# Patient Record
Sex: MALE | Race: WHITE | NOT HISPANIC OR LATINO | Employment: OTHER | URBAN - NONMETROPOLITAN AREA
[De-identification: names, ages, dates, MRNs, and addresses within clinical notes are randomized per-mention and may not be internally consistent; named-entity substitution may affect disease eponyms.]

---

## 2017-01-26 DIAGNOSIS — E78.5 HYPERLIPIDEMIA: Primary | ICD-10-CM

## 2017-01-30 RX ORDER — SIMVASTATIN 40 MG
TABLET ORAL
Qty: 45 TABLET | Refills: 1 | Status: SHIPPED | OUTPATIENT
Start: 2017-01-30 | End: 2018-01-18

## 2017-02-14 DIAGNOSIS — E11.9 DIABETES MELLITUS, TYPE 2 (H): ICD-10-CM

## 2017-02-14 NOTE — LETTER
[address] [address]  [phone]        Efrain Weiner  [address] [place] [id number]      February 14, 2017      Dear Efrain Weiner    APPOINTMENT REMINDER:       Our record indicates that it is time for you to be seen for a diabetes follow up with labs.    You may call our office at [phone] to schedule an appointment.    Please disregard this notice if you have already made an appointment.        Sincerely,    Rodolfo Cisneros MD  Family Practice

## 2017-02-14 NOTE — TELEPHONE ENCOUNTER
Metformin 500mg tab         Last Written Prescription Date: 5/17/2016  Last Fill Quantity: 180, # refills: 0  Last Office Visit with [facility], [facility] or [facility] prescribing provider:  6-1-2016        BP Readings from Last 3 Encounters:   06/01/16 128/74   01/11/16 173/95   10/20/15 130/82     Lab Results   Component Value Date    MICROL 9 06/01/2016     No results found for: MICROALBUMIN  Creatinine   Date Value Ref Range Status   06/01/2016 0.97 0.66 - 1.25 mg/dL Final   ]  GFR Estimate   Date Value Ref Range Status   06/01/2016 75 >60 mL/min/1.7m2 Final     Comment:     Non  GFR Calc   01/11/2016 60 (L) >60 mL/min/1.7m2 Final     Comment:     Non  GFR Calc   03/20/2015 71 >60 mL/min/1.7m2 Final     Comment:     Non  GFR Calc     GFR Estimate If Black   Date Value Ref Range Status   06/01/2016 >90   GFR Calc   >60 mL/min/1.7m2 Final   01/11/2016 73 >60 mL/min/1.7m2 Final     Comment:      GFR Calc   03/20/2015 86 >60 mL/min/1.7m2 Final     Comment:      GFR Calc     Lab Results   Component Value Date    CHOL 141 06/01/2016     Lab Results   Component Value Date    HDL 51 06/01/2016     Lab Results   Component Value Date    LDL 73 06/01/2016     Lab Results   Component Value Date    TRIG 84 06/01/2016     Lab Results   Component Value Date    CHOLHDLRATIO 3.2 03/20/2015     Lab Results   Component Value Date    AST 14 06/01/2016     Lab Results   Component Value Date    ALT 17 06/01/2016     Lab Results   Component Value Date    A1C 6.9 06/01/2016    A1C 6.6 03/20/2015    A1C 6.6 07/01/2014    A1C 7.0 03/20/2014    A1C 7.1 06/01/2012     Potassium   Date Value Ref Range Status   06/01/2016 4.2 3.4 - 5.3 mmol/L Final

## 2017-02-15 ENCOUNTER — HOSPITAL ENCOUNTER (EMERGENCY)
Facility: HOSPITAL | Age: 77
Discharge: HOME | End: 2017-02-15
Attending: NURSE PRACTITIONER | Admitting: NURSE PRACTITIONER
Payer: MEDICARE

## 2017-02-15 ENCOUNTER — TELEPHONE (OUTPATIENT)
Dept: FAMILY MEDICINE | Facility: OTHER | Age: 77
End: 2017-02-15

## 2017-02-15 VITALS — DIASTOLIC BLOOD PRESSURE: 81 MMHG | SYSTOLIC BLOOD PRESSURE: 162 MMHG | TEMPERATURE: 96.2 F | RESPIRATION RATE: 16 BRPM

## 2017-02-15 DIAGNOSIS — J01.00 ACUTE MAXILLARY SINUSITIS, RECURRENCE NOT SPECIFIED: ICD-10-CM

## 2017-02-15 PROCEDURE — 99213 OFFICE O/P EST LOW 20 MIN: CPT

## 2017-02-15 PROCEDURE — 99213 OFFICE O/P EST LOW 20 MIN: CPT | Performed by: NURSE PRACTITIONER

## 2017-02-15 ASSESSMENT — ENCOUNTER SYMPTOMS
SINUS PRESSURE: 1
SHORTNESS OF BREATH: 0
COUGH: 0
NAUSEA: 0
SORE THROAT: 0
STRIDOR: 0
CHILLS: 0
DYSURIA: 0
APPETITE CHANGE: 0
TROUBLE SWALLOWING: 0
VOMITING: 0
ACTIVITY CHANGE: 0
PSYCHIATRIC NEGATIVE: 1
FEVER: 0

## 2017-02-15 NOTE — ED AVS SNAPSHOT
[facility]    [address]    [place] [address]    Phone:  [phone]                                       Efrain Weiner   MRN: [id number]    Department:  [facility]   Date of Visit:  2/15/2017           Patient Information     Date Of Birth          1940        Your diagnoses for this visit were:     Acute maxillary sinusitis, recurrence not specified        You were seen by Ce Mcdaniel NP.      Follow-up Information     Follow up with Rodolfo Cisneros MD.    Specialty:  Family Practice    Why:  As needed, If symptoms worsen    Contact information:    [facility]  [address]  [place] [address] [phone]          Follow up with [facility].    Specialty:  EMERGENCY MEDICINE    Why:  As needed, If symptoms worsen    Contact information:    [address]  [place] [address] [phone]    Additional information:    From [place]: Take [address]. Turn left at [address]. Turn left at the first stoplight on [address]. At the first stop sign, take a right onto [address]. Take a left into the parking lot and continue through until you reach the North enterance of the building.       From [place]: Take [address]. Take the [address] ramp towards [place]. Turn left onto [address]. Take a slight right onto [address]. Turn left at the first stoplight on [address]. At the first stop sign, take a right onto [address]. Take a left into the parking lot and continue through until you reach the North enterance of the building.       From [place]: Take [address]. Take a right at [address]. At the first stop sign, take a right onto [address]. Take a left into the parking lot and continue through until you reach the North enterance of the building.         Discharge Instructions       Take antibiotics as directed.   Eat a yogurt a day  while on antibiotics.   Take Claritin daily for 10 days.   You can try Flonase 1 spray to each nostril daily for 10 days to decrease inflammation in sinuses.   Increase fluid intake.   Follow up with PCP with any increase in symptoms or concerns.   Return to urgent care or emergency department with any increase in symptoms or concerns.     Discharge References/Attachments     SINUSITIS (ANTIBIOTIC TREATMENT) (ENGLISH)         Review of your medicines      START taking        Dose / Directions Last dose taken    amoxicillin-clavulanate 875-125 MG per tablet   Commonly known as:  AUGMENTIN   Dose:  1 tablet   Quantity:  20 tablet        Take 1 tablet by mouth 2 times daily for 10 days   Refills:  0          Our records show that you are taking the medicines listed below. If these are incorrect, please call your family doctor or clinic.        Dose / Directions Last dose taken    blood glucose monitoring lancets   Quantity:  102 each        Use to test blood sugar 1 time daily or as directed.   Refills:  8        blood glucose monitoring meter device kit   Quantity:  1 kit        Use to test blood sugars 1 time daily or as directed.   Refills:  0        blood glucose monitoring test strip   Commonly known as:  ACCU-CHEK LIZETTE   Quantity:  1 Box        Use to test blood sugars 1 time daily or as directed.   Refills:  3        Buffered Aspirin 325 MG Tabs        daily   Refills:  0        DiphenhydrAMINE HCl (Sleep) 25 MG Caps   Dose:  1 tablet        Take 1 tablet by mouth bedtime   Refills:  0        GLUCOSAMINE CHONDRO COMPLEX OR        daily   Refills:  0        ibuprofen 200 MG tablet   Commonly known as:  ADVIL/MOTRIN   Dose:  1 tablet        Take 1 tablet by mouth every 6 hours as needed. With food   Refills:  0        metFORMIN 500 MG tablet   Commonly known as:  GLUCOPHAGE   Quantity:  60 tablet        Take one (1) tablet BY MOUTH twice daily WITH MEALS   Refills:  0        nabumetone 750 MG tablet   Commonly  "known as:  RELAFEN   Dose:  750 mg   Quantity:  180 tablet        Take 1 tablet (750 mg) by mouth 2 times daily (with meals)   Refills:  3        pseudoePHEDrine 60 MG tablet   Commonly known as:  SUDAFED        daily   Refills:  0        simvastatin 40 MG tablet   Commonly known as:  ZOCOR   Quantity:  45 tablet        Take one (1) tablet BY MOUTH every other day   Refills:  1        vitamin D 2000 UNITS tablet   Dose:  2000 Units   Quantity:  100 tablet        Take 2,000 Units by mouth daily   Refills:  3                Prescriptions were sent or printed at these locations (1 Prescription)                   Axel Patrick [address] [address]    Telephone:  [phone]   Fax:  [phone]   Hours:                  E-Prescribed (1 of 1)         amoxicillin-clavulanate (AUGMENTIN) 875-125 MG per tablet                Orders Needing Specimen Collection     None      Pending Results     No orders found from 2/13/2017 to 2/16/2017.            Pending Culture Results     No orders found from 2/13/2017 to 2/16/2017.            Thank you for choosing [facility]       Thank you for choosing [facility] for your care. Our goal is always to provide you with excellent care. Hearing back from our patients is one way we can continue to improve our services. Please take a few minutes to complete the written survey that you may receive in the mail after you visit with us. Thank you!        Perkle Information     [employer]kle lets you send messages to your doctor, view your test results, renew your prescriptions, schedule appointments and more. To sign up, go to [email or web address] . Click on \"Log in\" on the left side of the screen, which will take you to the Welcome page. Then click on \"Sign up Now\" on the right side of the page.     You will be asked to enter the access code listed below, as well as some personal information. Please follow the directions to create your username " and password.     Your access code is: [email or web address]  Expires: 2017  3:43 PM     Your access code will  in 90 days. If you need help or a new code, please call your [facility] or [phone].        Care EveryWhere ID     This is your Care EveryWhere ID. This could be used by other organizations to access your [facility] medical records  [id number]        After Visit Summary       This is your record. Keep this with you and show to your community pharmacist(s) and doctor(s) at your next visit.

## 2017-02-15 NOTE — ED AVS SNAPSHOT
[facility]    [address] [place] [address]    Phone:  [phone]                                       Efrain Weiner   MRN: [id number]    Department:  [facility]   Date of Visit:  2/15/2017           After Visit Summary Signature Page     I have received my discharge instructions, and my questions have been answered. I have discussed any challenges I see with this plan with the nurse or doctor.    ..........................................................................................................................................  Patient/Patient Representative Signature      ..........................................................................................................................................  Patient Representative Print Name and Relationship to Patient    ..................................................               ................................................  Date                                            Time    ..........................................................................................................................................  Reviewed by Signature/Title    ...................................................              ..............................................  Date                                                            Time

## 2017-02-15 NOTE — TELEPHONE ENCOUNTER
Pt calling stating that he has a sinus infection and would like Dr to call something in for him as he stated he knows it'll be a week before he can get in to see him. Please call him

## 2017-02-15 NOTE — ED PROVIDER NOTES
History     Chief Complaint   Patient presents with     Sinusitis     concerned about possible sinus infection and wants and antibiotic, notes starts as a tooth ache     The history is provided by the patient. No  was used.     Efrain Weiner is a 77 year old male who presents with sinus pressure for the past few days. Last sinus infection was a couple years ago. He has taken sudafed and benadryl that has been mildly effective. Denies fever, chills, or night sweats. Eating and drinking well. Bowel and bladder are working well. Blood glucose is ok when he checks it.       I have reviewed the Medications, Allergies, Past Medical and Surgical History, and Social History in the Epic system.    Review of Systems   Constitutional: Negative for activity change, appetite change, chills and fever.   HENT: Positive for congestion, ear pain, postnasal drip and sinus pressure. Negative for ear discharge, nosebleeds, sore throat and trouble swallowing.    Respiratory: Negative for cough, shortness of breath and stridor.    Gastrointestinal: Negative for nausea and vomiting.   Genitourinary: Negative for dysuria.   Skin: Negative for rash.   Psychiatric/Behavioral: Negative.        Physical Exam   BP: (!) 183/81. Recheck /81.  Heart Rate: 69  Temp: 96.2  F (35.7  C)  Resp: 16  Physical Exam   Constitutional: He is oriented to person, place, and time. He appears well-developed and well-nourished. No distress.   HENT:   Right Ear: External ear normal.   Left Ear: External ear normal.   Mouth/Throat: Oropharynx is clear and moist. No oropharyngeal exudate.   Maxillary sinus tenderness and fullness on palpation.    Neck: Normal range of motion. Neck supple.   Cardiovascular: Normal rate, regular rhythm and normal heart sounds.    Pulmonary/Chest: Effort normal. No respiratory distress. He has no wheezes. He has no rales.   Abdominal: Soft. He exhibits no distension.   Musculoskeletal: Normal range of  motion.   Lymphadenopathy:     He has no cervical adenopathy.   Neurological: He is alert and oriented to person, place, and time.   Skin: Skin is warm and dry. No rash noted. He is not diaphoretic.   Psychiatric: He has a normal mood and affect. His behavior is normal.   Nursing note and vitals reviewed.      ED Course     ED Course     Procedures      Assessments & Plan (with Medical Decision Making)     Discussed plan of care. He verbalized understanding. All questions answered.     I have reviewed the nursing notes.    I have reviewed the findings, diagnosis, plan and need for follow up with the patient.  Discharged in stable condition.     Discharge Medication List as of 2/15/2017  3:44 PM      START taking these medications    Details   amoxicillin-clavulanate (AUGMENTIN) 875-125 MG per tablet Take 1 tablet by mouth 2 times daily for 10 days, Disp-20 tablet, R-0, E-Prescribe             Final diagnoses:   Acute maxillary sinusitis, recurrence not specified      Take antibiotics as directed.   Eat a yogurt a day while on antibiotics.   Take Claritin daily for 10 days.   You can try Flonase 1 spray to each nostril daily for 10 days to decrease inflammation in sinuses.   Increase fluid intake.   Follow up with PCP with any increase in symptoms or concerns.   Return to urgent care or emergency department with any increase in symptoms or concerns.     TAMMY Israel  2/15/2017  3:22 PM  [facility] CL[facility]                 Ce Mcdaniel NP  02/20/17 1939

## 2017-02-15 NOTE — TELEPHONE ENCOUNTER
Please schedule patient for date/time: Patient needs to be seen, Urgent Care or with another provider. Antibiotics are not able to be prescribed over the phone.     Have patient go to ER/Urgent Care Center. Urgent Care hours are 9:30 am to 8 pm, open 7 days a week. Yes.    Provider will call patient.No.    Other:

## 2017-02-15 NOTE — ED NOTES
Pt presents today alone for c/o what he feels is a sinus infection and is hoping to get an Rx for Augmentin.

## 2017-02-21 NOTE — DISCHARGE INSTRUCTIONS
Take antibiotics as directed.   Eat a yogurt a day while on antibiotics.   Take Claritin daily for 10 days.   You can try Flonase 1 spray to each nostril daily for 10 days to decrease inflammation in sinuses.   Increase fluid intake.   Follow up with PCP with any increase in symptoms or concerns.   Return to urgent care or emergency department with any increase in symptoms or concerns.

## 2017-05-18 DIAGNOSIS — E11.9 CONTROLLED TYPE 2 DIABETES MELLITUS WITHOUT COMPLICATION, WITHOUT LONG-TERM CURRENT USE OF INSULIN (H): Primary | ICD-10-CM

## 2017-05-18 DIAGNOSIS — E11.9 DIABETES MELLITUS, TYPE 2 (H): ICD-10-CM

## 2017-05-18 NOTE — TELEPHONE ENCOUNTER
PCP Dr. Cisneros. Last office visit 06/01/16.  Metformin last filled 02/14/17 #60.  Patient due for office visit. No appointment made in [facility].

## 2017-05-23 ENCOUNTER — TELEPHONE (OUTPATIENT)
Dept: FAMILY MEDICINE | Facility: OTHER | Age: 77
End: 2017-05-23

## 2017-05-23 DIAGNOSIS — E78.2 MIXED HYPERLIPIDEMIA: ICD-10-CM

## 2017-05-23 DIAGNOSIS — C61 PROSTATE CANCER (H): Primary | ICD-10-CM

## 2017-05-23 DIAGNOSIS — Z12.5 ENCOUNTER FOR SCREENING FOR MALIGNANT NEOPLASM OF PROSTATE: ICD-10-CM

## 2017-05-23 DIAGNOSIS — E11.9 TYPE 2 DIABETES MELLITUS WITHOUT COMPLICATION (H): ICD-10-CM

## 2017-05-23 DIAGNOSIS — E11.21 CONTROLLED TYPE 2 DIABETES MELLITUS WITH DIABETIC NEPHROPATHY, WITH LONG-TERM CURRENT USE OF INSULIN (H): ICD-10-CM

## 2017-05-23 DIAGNOSIS — E11.9 TYPE 2 DIABETES MELLITUS WITHOUT COMPLICATION (H): Primary | ICD-10-CM

## 2017-05-23 DIAGNOSIS — C61 PROSTATE CANCER (H): ICD-10-CM

## 2017-05-23 DIAGNOSIS — Z79.4 CONTROLLED TYPE 2 DIABETES MELLITUS WITH DIABETIC NEPHROPATHY, WITH LONG-TERM CURRENT USE OF INSULIN (H): ICD-10-CM

## 2017-05-23 LAB
ALBUMIN SERPL-MCNC: 3.1 G/DL (ref 3.4–5)
ALBUMIN UR-MCNC: NEGATIVE MG/DL
ALP SERPL-CCNC: 92 U/L (ref 40–150)
ALT SERPL W P-5'-P-CCNC: 16 U/L (ref 0–70)
ANION GAP SERPL CALCULATED.3IONS-SCNC: 8 MMOL/L (ref 3–14)
APPEARANCE UR: CLEAR
AST SERPL W P-5'-P-CCNC: 12 U/L (ref 0–45)
BASOPHILS # BLD AUTO: 0.1 10E9/L (ref 0–0.2)
BASOPHILS NFR BLD AUTO: 1 %
BILIRUB SERPL-MCNC: 1.1 MG/DL (ref 0.2–1.3)
BILIRUB UR QL STRIP: NEGATIVE
BUN SERPL-MCNC: 14 MG/DL (ref 7–30)
CALCIUM SERPL-MCNC: 8.2 MG/DL (ref 8.5–10.1)
CHLORIDE SERPL-SCNC: 108 MMOL/L (ref 94–109)
CHOLEST SERPL-MCNC: 155 MG/DL
CO2 SERPL-SCNC: 25 MMOL/L (ref 20–32)
COLOR UR AUTO: NORMAL
CREAT SERPL-MCNC: 0.98 MG/DL (ref 0.66–1.25)
CREAT UR-MCNC: 50 MG/DL
DIFFERENTIAL METHOD BLD: NORMAL
EOSINOPHIL # BLD AUTO: 0.2 10E9/L (ref 0–0.7)
EOSINOPHIL NFR BLD AUTO: 3.4 %
ERYTHROCYTE [DISTWIDTH] IN BLOOD BY AUTOMATED COUNT: 13 % (ref 10–15)
EST. AVERAGE GLUCOSE BLD GHB EST-MCNC: 151 MG/DL
GFR SERPL CREATININE-BSD FRML MDRD: 75 ML/MIN/1.7M2
GLUCOSE SERPL-MCNC: 100 MG/DL (ref 70–99)
GLUCOSE UR STRIP-MCNC: NEGATIVE MG/DL
HBA1C MFR BLD: 6.9 % (ref 4.3–6)
HCT VFR BLD AUTO: 45.4 % (ref 40–53)
HDLC SERPL-MCNC: 47 MG/DL
HGB BLD-MCNC: 15.5 G/DL (ref 13.3–17.7)
HGB UR QL STRIP: NEGATIVE
IMM GRANULOCYTES # BLD: 0 10E9/L (ref 0–0.4)
IMM GRANULOCYTES NFR BLD: 0.2 %
KETONES UR STRIP-MCNC: NEGATIVE MG/DL
LDLC SERPL CALC-MCNC: 89 MG/DL
LEUKOCYTE ESTERASE UR QL STRIP: NEGATIVE
LYMPHOCYTES # BLD AUTO: 2 10E9/L (ref 0.8–5.3)
LYMPHOCYTES NFR BLD AUTO: 39.4 %
MCH RBC QN AUTO: 31.1 PG (ref 26.5–33)
MCHC RBC AUTO-ENTMCNC: 34.1 G/DL (ref 31.5–36.5)
MCV RBC AUTO: 91 FL (ref 78–100)
MICROALBUMIN UR-MCNC: 6 MG/L
MICROALBUMIN/CREAT UR: 11.79 MG/G CR (ref 0–17)
MONOCYTES # BLD AUTO: 0.4 10E9/L (ref 0–1.3)
MONOCYTES NFR BLD AUTO: 8.3 %
NEUTROPHILS # BLD AUTO: 2.4 10E9/L (ref 1.6–8.3)
NEUTROPHILS NFR BLD AUTO: 47.7 %
NITRATE UR QL: NEGATIVE
NONHDLC SERPL-MCNC: 108 MG/DL
NRBC # BLD AUTO: 0 10*3/UL
NRBC BLD AUTO-RTO: 0 /100
PH UR STRIP: 6.5 PH (ref 4.7–8)
PLATELET # BLD AUTO: 230 10E9/L (ref 150–450)
POTASSIUM SERPL-SCNC: 4.4 MMOL/L (ref 3.4–5.3)
PROT SERPL-MCNC: 7.4 G/DL (ref 6.8–8.8)
PSA SERPL-ACNC: NORMAL UG/L (ref 0–4)
RBC # BLD AUTO: 4.99 10E12/L (ref 4.4–5.9)
SODIUM SERPL-SCNC: 141 MMOL/L (ref 133–144)
SP GR UR STRIP: 1.01 (ref 1–1.03)
TRIGL SERPL-MCNC: 94 MG/DL
TSH SERPL DL<=0.05 MIU/L-ACNC: 1.46 MU/L (ref 0.4–4)
URN SPEC COLLECT METH UR: NORMAL
UROBILINOGEN UR STRIP-MCNC: NORMAL MG/DL (ref 0–2)
WBC # BLD AUTO: 5.1 10E9/L (ref 4–11)

## 2017-05-23 PROCEDURE — 82043 UR ALBUMIN QUANTITATIVE: CPT | Mod: ZL | Performed by: FAMILY MEDICINE

## 2017-05-23 PROCEDURE — 85025 COMPLETE CBC W/AUTO DIFF WBC: CPT | Mod: ZL | Performed by: FAMILY MEDICINE

## 2017-05-23 PROCEDURE — 80061 LIPID PANEL: CPT | Mod: ZL | Performed by: FAMILY MEDICINE

## 2017-05-23 PROCEDURE — 84443 ASSAY THYROID STIM HORMONE: CPT | Mod: ZL | Performed by: FAMILY MEDICINE

## 2017-05-23 PROCEDURE — 84153 ASSAY OF PSA TOTAL: CPT | Mod: ZL | Performed by: FAMILY MEDICINE

## 2017-05-23 PROCEDURE — 83036 HEMOGLOBIN GLYCOSYLATED A1C: CPT | Mod: ZL | Performed by: FAMILY MEDICINE

## 2017-05-23 PROCEDURE — 40000788 ZZHCL STATISTIC ESTIMATED AVERAGE GLUCOSE: Mod: ZL | Performed by: FAMILY MEDICINE

## 2017-05-23 PROCEDURE — 80053 COMPREHEN METABOLIC PANEL: CPT | Mod: ZL | Performed by: FAMILY MEDICINE

## 2017-05-23 PROCEDURE — 36415 COLL VENOUS BLD VENIPUNCTURE: CPT | Mod: ZL | Performed by: FAMILY MEDICINE

## 2017-05-23 PROCEDURE — 81003 URINALYSIS AUTO W/O SCOPE: CPT | Mod: ZL | Performed by: FAMILY MEDICINE

## 2017-05-23 PROCEDURE — G0103 PSA SCREENING: HCPCS | Mod: ZL | Performed by: FAMILY MEDICINE

## 2017-05-23 NOTE — TELEPHONE ENCOUNTER
Patient came in for lab only for Dr. Cisneros because he received a letter. Orders pending   SHAY FLORES

## 2017-05-23 NOTE — LETTER
[facility] [place]  [address] [facility]  [place] [address]  [phone]      May 24, 2017      Efrain Weiner  [address] [place] [id number]        Dear Efrain,    Below are the result of your most recent lab work:  Results for orders placed or performed in visit on 05/23/17   Hemoglobin A1c   Result Value Ref Range    Hemoglobin A1C 6.9 (H) 4.3 - 6.0 %   CBC with platelets differential   Result Value Ref Range    WBC 5.1 4.0 - 11.0 10e9/L    RBC Count 4.99 4.4 - 5.9 10e12/L    Hemoglobin 15.5 13.3 - 17.7 g/dL    Hematocrit 45.4 40.0 - 53.0 %    MCV 91 78 - 100 fl    MCH 31.1 26.5 - 33.0 pg    MCHC 34.1 31.5 - 36.5 g/dL    RDW 13.0 10.0 - 15.0 %    Platelet Count 230 150 - 450 10e9/L    Diff Method Automated Method     % Neutrophils 47.7 %    % Lymphocytes 39.4 %    % Monocytes 8.3 %    % Eosinophils 3.4 %    % Basophils 1.0 %    % Immature Granulocytes 0.2 %    Nucleated RBCs 0 0 /100    Absolute Neutrophil 2.4 1.6 - 8.3 10e9/L    Absolute Lymphocytes 2.0 0.8 - 5.3 10e9/L    Absolute Monocytes 0.4 0.0 - 1.3 10e9/L    Absolute Eosinophils 0.2 0.0 - 0.7 10e9/L    Absolute Basophils 0.1 0.0 - 0.2 10e9/L    Abs Immature Granulocytes 0.0 0 - 0.4 10e9/L    Absolute Nucleated RBC 0.0    Comprehensive metabolic panel   Result Value Ref Range    Sodium 141 133 - 144 mmol/L    Potassium 4.4 3.4 - 5.3 mmol/L    Chloride 108 94 - 109 mmol/L    Carbon Dioxide 25 20 - 32 mmol/L    Anion Gap 8 3 - 14 mmol/L    Glucose 100 (H) 70 - 99 mg/dL    Urea Nitrogen 14 7 - 30 mg/dL    Creatinine 0.98 0.66 - 1.25 mg/dL    GFR Estimate 75 >60 mL/min/1.7m2    GFR Estimate If Black >90   GFR Calc   >60 mL/min/1.7m2    Calcium 8.2 (L) 8.5 - 10.1 mg/dL    Bilirubin Total 1.1 0.2 - 1.3 mg/dL    Albumin 3.1 (L) 3.4 - 5.0 g/dL    Protein Total 7.4 6.8 - 8.8 g/dL    Alkaline Phosphatase 92 40 - 150 U/L    ALT 16 0 - 70 U/L    AST 12 0 - 45 U/L   UA reflex to Microscopic   Result Value Ref Range    Color Urine Light Yellow      Appearance Urine Clear     Glucose Urine Negative NEG mg/dL    Bilirubin Urine Negative NEG    Ketones Urine Negative NEG mg/dL    Specific Gravity Urine 1.008 1.003 - 1.035    Blood Urine Negative NEG    pH Urine 6.5 4.7 - 8.0 pH    Protein Albumin Urine Negative NEG mg/dL    Urobilinogen mg/dL Normal 0.0 - 2.0 mg/dL    Nitrite Urine Negative NEG    Leukocyte Esterase Urine Negative NEG    Source Midstream Urine    Albumin Random Urine Quantitative   Result Value Ref Range    Creatinine Urine 50 mg/dL    Albumin Urine mg/L 6 mg/L    Albumin Urine mg/g Cr 11.79 0 - 17 mg/g Cr   TSH   Result Value Ref Range    TSH 1.46 0.40 - 4.00 mU/L   Prostate spec antigen screen   Result Value Ref Range    PSA  0 - 4 ug/L     <0.01  Assay Method:  Chemiluminescence using Siemens Vista analyzer     Lipid Profile   Result Value Ref Range    Cholesterol 155 <200 mg/dL    Triglycerides 94 <150 mg/dL    HDL Cholesterol 47 >39 mg/dL    LDL Cholesterol Calculated 89 <100 mg/dL    Non HDL Cholesterol 108 <130 mg/dL   Estimated Average Glucose   Result Value Ref Range    Estimated Average Glucose 151 mg/dL     If you have any questions or concerns, please contact myself or my nurse at [phone].      Sincerely,        Rodolfo Cisneros MD

## 2017-05-25 ENCOUNTER — HOSPITAL ENCOUNTER (EMERGENCY)
Facility: HOSPITAL | Age: 77
Discharge: ANOTHER HOSPITAL | End: 2017-05-25
Attending: NURSE PRACTITIONER | Admitting: NURSE PRACTITIONER
Payer: MEDICARE

## 2017-05-25 ENCOUNTER — TRANSFERRED RECORDS (OUTPATIENT)
Dept: HEALTH INFORMATION MANAGEMENT | Facility: HOSPITAL | Age: 77
End: 2017-05-25

## 2017-05-25 VITALS
WEIGHT: 217 LBS | TEMPERATURE: 97.9 F | HEART RATE: 67 BPM | DIASTOLIC BLOOD PRESSURE: 93 MMHG | SYSTOLIC BLOOD PRESSURE: 162 MMHG | RESPIRATION RATE: 16 BRPM | HEIGHT: 71 IN | OXYGEN SATURATION: 98 % | BODY MASS INDEX: 30.38 KG/M2

## 2017-05-25 DIAGNOSIS — S09.90XA HEAD INJURY, INITIAL ENCOUNTER: ICD-10-CM

## 2017-05-25 DIAGNOSIS — S22.059A CLOSED FRACTURE OF FIFTH THORACIC VERTEBRA, UNSPECIFIED FRACTURE MORPHOLOGY, INITIAL ENCOUNTER (H): ICD-10-CM

## 2017-05-25 DIAGNOSIS — S01.01XA LACERATION OF SCALP, INITIAL ENCOUNTER: ICD-10-CM

## 2017-05-25 PROCEDURE — 72125 CT NECK SPINE W/O DYE: CPT | Mod: TC

## 2017-05-25 PROCEDURE — 99214 OFFICE O/P EST MOD 30 MIN: CPT | Performed by: NURSE PRACTITIONER

## 2017-05-25 PROCEDURE — 99214 OFFICE O/P EST MOD 30 MIN: CPT | Mod: 25

## 2017-05-25 PROCEDURE — 72128 CT CHEST SPINE W/O DYE: CPT | Mod: TC

## 2017-05-25 PROCEDURE — 96374 THER/PROPH/DIAG INJ IV PUSH: CPT

## 2017-05-25 PROCEDURE — 70450 CT HEAD/BRAIN W/O DYE: CPT | Mod: TC

## 2017-05-25 PROCEDURE — 25000128 H RX IP 250 OP 636: Performed by: NURSE PRACTITIONER

## 2017-05-25 RX ORDER — KETOROLAC TROMETHAMINE 15 MG/ML
15 INJECTION, SOLUTION INTRAMUSCULAR; INTRAVENOUS ONCE
Status: COMPLETED | OUTPATIENT
Start: 2017-05-25 | End: 2017-05-25

## 2017-05-25 RX ADMIN — KETOROLAC TROMETHAMINE 15 MG: 15 INJECTION, SOLUTION INTRAMUSCULAR; INTRAVENOUS at 17:09

## 2017-05-25 ASSESSMENT — ENCOUNTER SYMPTOMS
SPEECH DIFFICULTY: 0
WOUND: 1
WEAKNESS: 0
HEADACHES: 0
VOMITING: 0
LIGHT-HEADEDNESS: 0
BACK PAIN: 1
FACIAL SWELLING: 0
APPETITE CHANGE: 0
COUGH: 0
NECK PAIN: 0
CHILLS: 0
NUMBNESS: 0
SHORTNESS OF BREATH: 0
FEVER: 0
ABDOMINAL PAIN: 0
FACIAL ASYMMETRY: 0
CONFUSION: 0
SEIZURES: 0
DIZZINESS: 0
NAUSEA: 0

## 2017-05-25 NOTE — ED NOTES
Pt presents with c/o having an aluminum step ladder fold up while he was reaching up into his attic. Pt has an abrasion/laceration to the back of his head and abrasions on his left arm. Pt denies LOC, denies neck pain, does c/o back pain he rates 3/10. Ice pack applied to the back of head.

## 2017-05-25 NOTE — ED PROVIDER NOTES
History     Chief Complaint   Patient presents with     Head Laceration     The history is provided by the patient. No  was used.     Efrain Weiner is a 77 year old male who presents today with a CC of head laceration and back pain.  He was on an aluminum step ladder in his garage, approximately 4 feet in the air, the ladder gave way and he fell.  He hit his head on the  auger on the way down and sustained a head laceration.  He landed on his upper back.  No LOC.  He reports a minimal headache.  The bleeding is well controlled.  He is on  daily.  He denies change in vision, numbness or tingling.  No pre or post injury amnesia.  Tdap is up to date 4/2015.    I have reviewed the Medications, Allergies, Past Medical and Surgical History, and Social History in the Epic system.    Review of Systems   Constitutional: Negative for appetite change, chills and fever.   HENT: Negative for ear pain, facial swelling and nosebleeds.    Respiratory: Negative for cough and shortness of breath.    Cardiovascular: Negative for chest pain.   Gastrointestinal: Negative for abdominal pain, nausea and vomiting.   Musculoskeletal: Positive for back pain (upper back pain). Negative for neck pain.   Skin: Positive for wound (left parietal head laceration).   Neurological: Negative for dizziness, seizures, syncope, facial asymmetry, speech difficulty, weakness, light-headedness, numbness and headaches.   Psychiatric/Behavioral: Negative for behavioral problems and confusion.       Physical Exam   BP: 177/97  Pulse: 65  Temp: 97.3  F (36.3  C)  Resp: 16  SpO2: 98 %    Physical Exam   Constitutional: He is oriented to person, place, and time. He appears well-developed and well-nourished. He is cooperative.  Non-toxic appearance. He does not appear ill. No distress.   HENT:   Head: Normocephalic and atraumatic.   Right Ear: External ear normal.   Left Ear: External ear normal.   Nose: Nose normal.    Mouth/Throat: Uvula is midline and oropharynx is clear and moist.   Eyes: Conjunctivae and EOM are normal. Pupils are equal, round, and reactive to light.   Neck: Normal range of motion. Neck supple. No spinous process tenderness and no muscular tenderness present.   Cardiovascular: Normal rate and regular rhythm.    Pulmonary/Chest: Effort normal and breath sounds normal. No respiratory distress. He has no wheezes. He has no rales. He exhibits tenderness.   Musculoskeletal:        Right shoulder: He exhibits normal range of motion and no bony tenderness.        Left shoulder: He exhibits normal range of motion and no tenderness.        Right hip: He exhibits normal range of motion, normal strength and no bony tenderness.        Left hip: He exhibits normal range of motion, normal strength and no bony tenderness.        Thoracic back: He exhibits tenderness and bony tenderness (no step off notes). He exhibits no edema and no deformity.   Superficial abrasion noted to lower back and left forearm.  Upper back skin is intact without erythema, edema, or ecchymosis.     Neurological: He is alert and oriented to person, place, and time. He has normal strength. No cranial nerve deficit (cranial nerves 2-12 grossly intact on exam) or sensory deficit. GCS eye subscore is 4. GCS verbal subscore is 5. GCS motor subscore is 6.   Nursing note and vitals reviewed.      ED Course     ED Course     Procedures  Results for orders placed or performed during the hospital encounter of 05/25/17   Head CT w/o contrast    Narrative    CT SCAN OF BRAIN WITHOUT CONTRAST    REPORT:  There is a lacunar infarct seen in the anterior limb of the  internal capsule on the right. The ventricular system is normal in  size.  There is white-matter low density in both hemispheres  consistent with small-vessel disease.  Brainstem and cerebellum appear  normal.  Cranial vault is intact.  Paranasal sinuses are clear.    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  Exam Date: May 25, 2017 03:58:00 PM  Author: BETH PRASAD  This report is final and signed     Cervical spine CT w/o contrast    Narrative    CT SCAN OF CERVICAL SPINE    REPORT:  There is degenerative changes at the middle atlantoaxial and  right lateral atlantoaxial joints.  The C2-C3 disk is normal height.  There is mild anterior osteophytes at C3-C4, C4-C5, C5-C6 and C6-C7.  There are cervical facet joints throughout the cervical spine, worse  on the right than the left.  No fractures of the vertebral arches are  noted.  The prevertebral soft tissues appear normal.    IMPRESSION:  MODERATE DEGENERATIVE CHANGES.  NO FRACTURES.  Exam Date: May 25, 2017 03:56:00 PM  Author: BETH PRASAD  This report is final and signed         Preliminary verbal report of Thoracic spine CT - T4 & T5 Vertebral Body Fractures - unstable    Assessments & Plan (with Medical Decision Making)     I have reviewed the nursing notes.    I have reviewed the findings, diagnosis, plan and need for follow up with the patient.  Consulted with Dr Alba, Trauma Surgeon at [facility] who recommends Ambulance Transfer to [facility] for evaluation, patient should be backboarded for transfer, Toradol is ok for pain medication.  Dr Alba will be the accepting physician.  He will go to the ED.  Report was called to Pat in the ED.     Current Discharge Medication List          Final diagnoses:   Closed fracture of fifth thoracic vertebra, unspecified fracture morphology, initial encounter (H) - vertebral body fracture T4-T5, unstable   Head injury, initial encounter   Laceration of scalp, initial encounter       5/25/2017   [facility]     Ina Armendariz NP  05/25/17 [id number]       Ina Armendariz NP  05/25/17 [id number]

## 2017-05-25 NOTE — ED NOTES
Pt  Ambulated in UC with his wife. Hand on the right side of the head with minimal bleeding. Complaints of pain 3 out of 10. No complaints of dizziness. He states he was on the fourth step of the ladder when the ladder collapsed. He also has a scrap on his left arm. No medication prior to visit. He is able to stand however, he states his back does hurt.  Right hip and both knees replaced.

## 2017-05-26 ENCOUNTER — TRANSFERRED RECORDS (OUTPATIENT)
Dept: HEALTH INFORMATION MANAGEMENT | Facility: HOSPITAL | Age: 77
End: 2017-05-26

## 2017-05-26 NOTE — PROGRESS NOTES
CT Scan of Thoracic Spine report routed to PCP, Dr. RELILY Cisneros.  Findings - Fracture of the T4 vertebral body, this fracture does not involve the vertebral arch.  There is a fracture of T5.  The ER was called with this report.  Pt was seen in  on 5/25.  REILLY Armendariz NP consulted with Dr Alba, Trauma Surgeon at [facility] ED who recommends Ambulance Transfer to [facility] for evaluation, patient should be backboarded for transfer.

## 2017-06-07 ENCOUNTER — OFFICE VISIT (OUTPATIENT)
Dept: FAMILY MEDICINE | Facility: OTHER | Age: 77
End: 2017-06-07
Attending: FAMILY MEDICINE
Payer: COMMERCIAL

## 2017-06-07 VITALS
HEART RATE: 79 BPM | SYSTOLIC BLOOD PRESSURE: 146 MMHG | WEIGHT: 213 LBS | OXYGEN SATURATION: 94 % | DIASTOLIC BLOOD PRESSURE: 78 MMHG | TEMPERATURE: 98 F | RESPIRATION RATE: 20 BRPM | BODY MASS INDEX: 29.71 KG/M2

## 2017-06-07 DIAGNOSIS — S22.008D OTHER CLOSED FRACTURE OF THORACIC VERTEBRA WITH ROUTINE HEALING, UNSPECIFIED THORACIC VERTEBRAL LEVEL, SUBSEQUENT ENCOUNTER: ICD-10-CM

## 2017-06-07 DIAGNOSIS — S01.01XD SCALP LACERATION, SUBSEQUENT ENCOUNTER: Primary | ICD-10-CM

## 2017-06-07 PROCEDURE — 99212 OFFICE O/P EST SF 10 MIN: CPT

## 2017-06-07 PROCEDURE — 99213 OFFICE O/P EST LOW 20 MIN: CPT | Performed by: FAMILY MEDICINE

## 2017-06-07 ASSESSMENT — PAIN SCALES - GENERAL: PAINLEVEL: NO PAIN (0)

## 2017-06-07 NOTE — MR AVS SNAPSHOT
"              After Visit Summary   6/7/2017    Efrain Weiner    MRN: [id number]           Patient Information     Date Of Birth          1940        Visit Information        Provider Department      6/7/2017 9:20 AM Rodolfo Cisneros MD [facility]        Today's Diagnoses     Scalp laceration, subsequent encounter    -  1    Other closed fracture of thoracic vertebra with routine healing, unspecified thoracic vertebral level, subsequent encounter          Care Instructions    Follow up with Neurosurgery          Follow-ups after your visit        Follow-up notes from your care team     Return if symptoms worsen or fail to improve.      Who to contact     If you have questions or need follow up information about today's clinic visit or your schedule please contact [facility] directly at [phone].  Normal or non-critical lab and imaging results will be communicated to you by [employer], letter or phone within 4 business days after the clinic has received the results. If you do not hear from us within 7 days, please contact the clinic through [employer] or phone. If you have a critical or abnormal lab result, we will notify you by phone as soon as possible.  Submit refill requests through [employer] or call your pharmacy and they will forward the refill request to us. Please allow 3 business days for your refill to be completed.          Additional Information About Your Visit        [employer] Information     [employer] lets you send messages to your doctor, view your test results, renew your prescriptions, schedule appointments and more. To sign up, go to [email or web address] . Click on \"Log in\" on the left side of the screen, which will take you to the Welcome page. Then click on \"Sign up Now\" on the right side of the page.     You will be asked to enter the access code listed below, as well as some personal information. Please follow the directions to create your username and password.   "   Your access code is: [email or web address]  Expires: 2017  5:13 PM     Your access code will  in 90 days. If you need help or a new code, please call your [place] clinic or [phone].        Care EveryWhere ID     This is your Care EveryWhere ID. This could be used by other organizations to access your [place] medical records  [id number]        Your Vitals Were     Pulse Temperature Respirations Pulse Oximetry BMI (Body Mass Index)       79 98  F (36.7  C) 20 94% 29.71 kg/m2        Blood Pressure from Last 3 Encounters:   17 146/78   17 162/93   02/15/17 162/81    Weight from Last 3 Encounters:   17 213 lb (96.6 kg)   17 217 lb (98.4 kg)   16 210 lb (95.3 kg)              Today, you had the following     No orders found for display       Primary Care Provider Office Phone # Fax #    Rodolfo Cisneros [phone]       [facility] [address] [facility] [id number]        Thank you!     Thank you for choosing [facility]  for your care. Our goal is always to provide you with excellent care. Hearing back from our patients is one way we can continue to improve our services. Please take a few minutes to complete the written survey that you may receive in the mail after your visit with us. Thank you!             Your Updated Medication List - Protect others around you: Learn how to safely use, store and throw away your medicines at www.disposemymeds.org.          This list is accurate as of: 17 11:59 PM.  Always use your most recent med list.                   Brand Name Dispense Instructions for use    blood glucose monitoring lancets     102 each    Use to test blood sugar 1 time daily or as directed.       blood glucose monitoring meter device kit     1 kit    Use to test blood sugars 1 time daily or as directed.       blood glucose monitoring test strip    ACCU-CHEK LIZETTE    1 Box    Use to test blood sugars 1 time daily or as directed.        Buffered Aspirin 325 MG Tabs      daily       DiphenhydrAMINE HCl (Sleep) 25 MG Caps      Take 1 tablet by mouth bedtime       GLUCOSAMINE CHONDRO COMPLEX OR      daily       ibuprofen 200 MG tablet    ADVIL/MOTRIN     Take 1 tablet by mouth every 6 hours as needed. With food       metFORMIN 500 MG tablet    GLUCOPHAGE    60 tablet    Take one (1) tablet BY MOUTH twice daily WITH MEALS       pseudoePHEDrine 60 MG tablet    SUDAFED     daily       simvastatin 40 MG tablet    ZOCOR    45 tablet    Take one (1) tablet BY MOUTH every other day       vitamin D 2000 UNITS tablet     100 tablet    Take 2,000 Units by mouth daily

## 2017-06-07 NOTE — NURSING NOTE
"Chief Complaint   Patient presents with     Suture Removal     fell 2 weeks ago off of a ladder hitting head       Initial /78 (BP Location: Left arm, Patient Position: Chair, Cuff Size: Adult Regular)  Pulse 79  Temp 98  F (36.7  C)  Resp 20  Wt 213 lb (96.6 kg)  SpO2 94%  BMI 29.71 kg/m2 Estimated body mass index is 29.71 kg/(m^2) as calculated from the following:    Height as of 5/25/17: 5' 11\" (1.803 m).    Weight as of this encounter: 213 lb (96.6 kg).  Medication Reconciliation: complete     Elin Mcenal      "

## 2017-06-07 NOTE — PROGRESS NOTES
SUBJECTIVE:  Efrain Weiner, 77 year old, male is seen in follow up of scalp laceration with thoracic spine fracture.  He fell and was seen in the ER where CT was performed and showed:    There is a fracture of the T4 vertebral body.  This fracture  does not involve the vertebral arch.  There is a fracture at T5. On  one side,the fracture through the base of the pedicle on the right.  The fracture line extends obliquely through the body and exits through  the mid-vertebral body level on the left.  There is bridging  syndesmophytes at T6, T7, T8, T9 and T10. No other fractures are seen.  The paravertebral soft tissues are normal.    He was transferred to [facility] under the care of Neurosurgery and was discharged home. Surgery was not indicated and he has been in a brace. Records are reviewed.  Staples to be removed.    Denies fever, headache, visual disturbance, dizziness, focal weakness, numbness, tingling, paresthesias, tremor, or gait disturbance.    Current Outpatient Prescriptions   Medication Sig Dispense Refill     metFORMIN (GLUCOPHAGE) 500 MG tablet Take one (1) tablet BY MOUTH twice daily WITH MEALS 60 tablet 0     simvastatin (ZOCOR) 40 MG tablet Take one (1) tablet BY MOUTH every other day 45 tablet 1     blood glucose monitoring (ACCU-CHEK LIZETTE) test strip Use to test blood sugars 1 time daily or as directed. 1 Box 3     Cholecalciferol (VITAMIN D) 2000 UNITS tablet Take 2,000 Units by mouth daily 100 tablet 3     ACCU-CHEK MULTICLIX LANCETS MISC Use to test blood sugar 1 time daily or as directed. 102 each 8     Blood Glucose Monitoring Suppl (ACCU-CHEK LIZETTE PLUS) W/DEVICE KIT Use to test blood sugars 1 time daily or as directed. 1 kit 0     DiphenhydrAMINE HCl, Sleep, 25 MG CAPS Take 1 tablet by mouth bedtime       ibuprofen (ADVIL,MOTRIN) 200 MG tablet Take 1 tablet by mouth every 6 hours as needed. With food       PSEUDOEPHEDRINE HCL 60 MG OR TABS daily       GLUCOSAMINE CHONDRO COMPLEX OR  daily       BUFFERED ASPIRIN 325 MG OR TABS daily          Allergies   Allergen Reactions     Nkda [No Known Drug Allergies]        Past Medical History:   Diagnosis Date     Allergic rhinitis, seasonal 3/25/2011     BPH 4/8/2005     Cancer, Prostate 3/25/2011     Chronic prostatitis 9/5/2007     Diabetes Mellitus, Type 2 9/29/2004     Dyslipidemia 3/25/2011     Eczema 3/25/2011     Erectile Dysfunction 3/25/2011     Mixed hyperlipidemia      Nephrolithiasis 8/24/2011     TERESO on CPAP      Osteoarthritis, Generalized 3/25/2011     Past Surgical History:   Procedure Laterality Date     BIOPSY PROSTATE TRANSRECTAL       cataract extraction      right     COLONOSCOPY      2006     COLONOSCOPY  4-     excision of acrochordon       excision of peritonsillar abscess       kidney stones blasted  2011     laparoscopic bilateral inguinal hernia repair  8/2011    Bilateral inguinal hernia     prostatectomy  8/2011    Adenocarcinoma of the prostate     shoulder arthroscopy with Pam procedure      right     VASECTOMY       Family History   Problem Relation Age of Onset     Prostate Cancer Father      cause of death     DIABETES Father      Hypertension Father      Other - See Comments Father      renal disease     CEREBROVASCULAR DISEASE Mother      CVA     DIABETES Paternal Grandfather      Glaucoma Brother      Asthma No family hx of      Social History     Social History     Marital status:      Spouse name: N/A     Number of children: N/A     Years of education: N/A     Occupational History      Retired      Retired     Social History Main Topics     Smoking status: Former Smoker     Types: Cigarettes     Quit date: 1/29/1969     Smokeless tobacco: Never Used      Comment: no passive smoke exposure     Alcohol use Not on file      Comment: socially     Drug use: No     Sexual activity: Not on file     Other Topics Concern      Service No     Blood Transfusions Yes     Permits if needed     Caffeine  Concern Yes     2 cups coffee daily     Occupational Exposure No     Hobby Hazards No     Sleep Concern Yes     takes benadryl     Stress Concern No     Weight Concern No     Special Diet No     Back Care No     Exercise No     Seat Belt No     Self-Exams No     Parent/Sibling W/ Cabg, Mi Or Angioplasty Before 65f 55m? No     Social History Narrative         Review Of Systems  Constitutional, HEENT, cardiovascular, pulmonary, gi and gu systems are negative, except as otherwise noted.    OBJECTIVE:  Vitals: B/P: 146/78, T: 98, P: 79, R: 20    Exam:  Physical Exam   Constitutional: He is oriented to person, place, and time. He appears well-developed and well-nourished. No distress.   Neurological: He is alert and oriented to person, place, and time.   Skin: Skin is warm and dry.   The posterior scalp wound in intact.  8 staples are removed without difficulty.   Psychiatric: He has a normal mood and affect.     Other exam not repeated    Labs:  Telephone on 05/23/2017   Component Date Value Ref Range Status     Hemoglobin A1C 05/23/2017 6.9* 4.3 - 6.0 % Final     WBC 05/23/2017 5.1  4.0 - 11.0 10e9/L Final     RBC Count 05/23/2017 4.99  4.4 - 5.9 10e12/L Final     Hemoglobin 05/23/2017 15.5  13.3 - 17.7 g/dL Final     Hematocrit 05/23/2017 45.4  40.0 - 53.0 % Final     MCV 05/23/2017 91  78 - 100 fl Final     MCH 05/23/2017 31.1  26.5 - 33.0 pg Final     MCHC 05/23/2017 34.1  31.5 - 36.5 g/dL Final     RDW 05/23/2017 13.0  10.0 - 15.0 % Final     Platelet Count 05/23/2017 230  150 - 450 10e9/L Final     Diff Method 05/23/2017 Automated Method   Final     % Neutrophils 05/23/2017 47.7  % Final     % Lymphocytes 05/23/2017 39.4  % Final     % Monocytes 05/23/2017 8.3  % Final     % Eosinophils 05/23/2017 3.4  % Final     % Basophils 05/23/2017 1.0  % Final     % Immature Granulocytes 05/23/2017 0.2  % Final     Nucleated RBCs 05/23/2017 0  0 /100 Final     Absolute Neutrophil 05/23/2017 2.4  1.6 - 8.3 10e9/L Final      Absolute Lymphocytes 05/23/2017 2.0  0.8 - 5.3 10e9/L Final     Absolute Monocytes 05/23/2017 0.4  0.0 - 1.3 10e9/L Final     Absolute Eosinophils 05/23/2017 0.2  0.0 - 0.7 10e9/L Final     Absolute Basophils 05/23/2017 0.1  0.0 - 0.2 10e9/L Final     Abs Immature Granulocytes 05/23/2017 0.0  0 - 0.4 10e9/L Final     Absolute Nucleated RBC 05/23/2017 0.0   Final     Sodium 05/23/2017 141  133 - 144 mmol/L Final     Potassium 05/23/2017 4.4  3.4 - 5.3 mmol/L Final     Chloride 05/23/2017 108  94 - 109 mmol/L Final     Carbon Dioxide 05/23/2017 25  20 - 32 mmol/L Final     Anion Gap 05/23/2017 8  3 - 14 mmol/L Final     Glucose 05/23/2017 100* 70 - 99 mg/dL Final     Urea Nitrogen 05/23/2017 14  7 - 30 mg/dL Final     Creatinine 05/23/2017 0.98  0.66 - 1.25 mg/dL Final     GFR Estimate 05/23/2017 75  >60 mL/min/1.7m2 Final    Non  GFR Calc     GFR Estimate If Black 05/23/2017   >60 mL/min/1.7m2 Final                    Value:>90   GFR Calc       Calcium 05/23/2017 8.2* 8.5 - 10.1 mg/dL Final     Bilirubin Total 05/23/2017 1.1  0.2 - 1.3 mg/dL Final     Albumin 05/23/2017 3.1* 3.4 - 5.0 g/dL Final     Protein Total 05/23/2017 7.4  6.8 - 8.8 g/dL Final     Alkaline Phosphatase 05/23/2017 92  40 - 150 U/L Final     ALT 05/23/2017 16  0 - 70 U/L Final     AST 05/23/2017 12  0 - 45 U/L Final     Color Urine 05/23/2017 Light Yellow   Final     Appearance Urine 05/23/2017 Clear   Final     Glucose Urine 05/23/2017 Negative  NEG mg/dL Final     Bilirubin Urine 05/23/2017 Negative  NEG Final     Ketones Urine 05/23/2017 Negative  NEG mg/dL Final     Specific Gravity Urine 05/23/2017 1.008  1.003 - 1.035 Final     Blood Urine 05/23/2017 Negative  NEG Final     pH Urine 05/23/2017 6.5  4.7 - 8.0 pH Final     Protein Albumin Urine 05/23/2017 Negative  NEG mg/dL Final     Urobilinogen mg/dL 05/23/2017 Normal  0.0 - 2.0 mg/dL Final     Nitrite Urine 05/23/2017 Negative  NEG Final     Leukocyte  Esterase Urine 05/23/2017 Negative  NEG Final     Source 05/23/2017 Midstream Urine   Final     Creatinine Urine 05/23/2017 50  mg/dL Final     Albumin Urine mg/L 05/23/2017 6  mg/L Final     Albumin Urine mg/g Cr 05/23/2017 11.79  0 - 17 mg/g Cr Final     TSH 05/23/2017 1.46  0.40 - 4.00 mU/L Final     PSA 05/23/2017   0 - 4 ug/L Final                    Value:<0.01  Assay Method:  Chemiluminescence using Siemens Vista analyzer       Cholesterol 05/23/2017 155  <200 mg/dL Final     Triglycerides 05/23/2017 94  <150 mg/dL Final     HDL Cholesterol 05/23/2017 47  >39 mg/dL Final     LDL Cholesterol Calculated 05/23/2017 89  <100 mg/dL Final    Desirable:       <100 mg/dl     Non HDL Cholesterol 05/23/2017 108  <130 mg/dL Final     Estimated Average Glucose 05/23/2017 151  mg/dL Final       ASSESSMENT/PLAN:  Scalp laceration, subsequent encounter  Wound is intact.  Will follow.    Other closed fracture of thoracic vertebra with routine healing, unspecified thoracic vertebral level, subsequent encounter  Records reviewed.  Continue follow up with Neurosurgery            Rodolfo Cisneros MD

## 2017-06-15 DIAGNOSIS — E11.9 CONTROLLED TYPE 2 DIABETES MELLITUS WITHOUT COMPLICATION, WITHOUT LONG-TERM CURRENT USE OF INSULIN (H): ICD-10-CM

## 2018-01-02 DIAGNOSIS — M19.90 OSTEOARTHRITIS, UNSPECIFIED OSTEOARTHRITIS TYPE, UNSPECIFIED SITE: Primary | ICD-10-CM

## 2018-01-03 NOTE — TELEPHONE ENCOUNTER
Relafen       Last Written Prescription Date: 7/27/2017- medication dc from med list     Last Fill Quantity: 180,  # refills: 3   Last Office Visit with [facility], [facility] or [facility] prescribing provider: 6/07/2017

## 2018-01-18 DIAGNOSIS — E78.5 HYPERLIPIDEMIA: ICD-10-CM

## 2018-01-18 RX ORDER — SIMVASTATIN 40 MG
TABLET ORAL
Qty: 45 TABLET | Refills: 0 | Status: SHIPPED | OUTPATIENT
Start: 2018-01-18 | End: 2018-05-04

## 2018-01-18 NOTE — TELEPHONE ENCOUNTER
zocor      Last Written Prescription Date:  1/30/17  Last Fill Quantity: 45,   # refills: 1  Last Office Visit: 6/7/17  Future Office visit:  none

## 2018-02-13 ENCOUNTER — OFFICE VISIT (OUTPATIENT)
Dept: FAMILY MEDICINE | Facility: OTHER | Age: 78
End: 2018-02-13
Attending: FAMILY MEDICINE
Payer: COMMERCIAL

## 2018-02-13 VITALS
TEMPERATURE: 98.2 F | OXYGEN SATURATION: 95 % | BODY MASS INDEX: 29.82 KG/M2 | RESPIRATION RATE: 16 BRPM | HEART RATE: 73 BPM | WEIGHT: 213 LBS | HEIGHT: 71 IN | SYSTOLIC BLOOD PRESSURE: 132 MMHG | DIASTOLIC BLOOD PRESSURE: 64 MMHG

## 2018-02-13 DIAGNOSIS — G47.33 OSA (OBSTRUCTIVE SLEEP APNEA): Primary | ICD-10-CM

## 2018-02-13 PROCEDURE — 99213 OFFICE O/P EST LOW 20 MIN: CPT | Performed by: FAMILY MEDICINE

## 2018-02-13 PROCEDURE — G0463 HOSPITAL OUTPT CLINIC VISIT: HCPCS

## 2018-02-13 ASSESSMENT — ANXIETY QUESTIONNAIRES
7. FEELING AFRAID AS IF SOMETHING AWFUL MIGHT HAPPEN: NOT AT ALL
2. NOT BEING ABLE TO STOP OR CONTROL WORRYING: NOT AT ALL
GAD7 TOTAL SCORE: 0
5. BEING SO RESTLESS THAT IT IS HARD TO SIT STILL: NOT AT ALL
1. FEELING NERVOUS, ANXIOUS, OR ON EDGE: NOT AT ALL
6. BECOMING EASILY ANNOYED OR IRRITABLE: NOT AT ALL
4. TROUBLE RELAXING: NOT AT ALL
IF YOU CHECKED OFF ANY PROBLEMS ON THIS QUESTIONNAIRE, HOW DIFFICULT HAVE THESE PROBLEMS MADE IT FOR YOU TO DO YOUR WORK, TAKE CARE OF THINGS AT HOME, OR GET ALONG WITH OTHER PEOPLE: NOT DIFFICULT AT ALL
3. WORRYING TOO MUCH ABOUT DIFFERENT THINGS: NOT AT ALL

## 2018-02-13 ASSESSMENT — PAIN SCALES - GENERAL: PAINLEVEL: NO PAIN (0)

## 2018-02-13 NOTE — PROGRESS NOTES
SUBJECTIVE:  Efrain Weiner, 77 year old, male is seen with the following medical problems.    Obstructive sleep apnea. Art is seen for Medicare certification of CPAP coverage.  He has a history of obstructive sleep apnea and was diagnosed in the distant past and has had previous sleep study.  This resulted in the prescription for CPAP device.  He uses this more than 4 hours per night and is compliant with therapy.  His symptoms of excessive daytime sleepiness, snoring, and fatigue are significantly improved, indicating that he is benefiting from therapy.    Art has no history of hypertension, coronary heart disease, heart failure, and cerebrovascular disease.    Current Outpatient Prescriptions   Medication Sig Dispense Refill     simvastatin (ZOCOR) 40 MG tablet Take one (1) tablet BY MOUTH every other day 45 tablet 0     nabumetone (RELAFEN) 750 MG tablet Take one (1) tablet BY MOUTH twice daily with food 180 tablet 0     metFORMIN (GLUCOPHAGE) 500 MG tablet Take one (1) tablet BY MOUTH twice daily WITH MEALS 60 tablet 5     blood glucose monitoring (ACCU-CHEK LIZETTE) test strip Use to test blood sugars 1 time daily or as directed. 1 Box 3     Cholecalciferol (VITAMIN D) 2000 UNITS tablet Take 2,000 Units by mouth daily 100 tablet 3     ACCU-CHEK MULTICLIX LANCETS MISC Use to test blood sugar 1 time daily or as directed. 102 each 8     Blood Glucose Monitoring Suppl (ACCU-CHEK LIZETTE PLUS) W/DEVICE KIT Use to test blood sugars 1 time daily or as directed. 1 kit 0     DiphenhydrAMINE HCl, Sleep, 25 MG CAPS Take 1 tablet by mouth bedtime       ibuprofen (ADVIL,MOTRIN) 200 MG tablet Take 1 tablet by mouth every 6 hours as needed. With food       PSEUDOEPHEDRINE HCL 60 MG OR TABS daily       GLUCOSAMINE CHONDRO COMPLEX OR daily       BUFFERED ASPIRIN 325 MG OR TABS daily          Allergies   Allergen Reactions     Nkda [No Known Drug Allergies]        Past Medical History:   Diagnosis Date     Allergic rhinitis,  seasonal 3/25/2011     BPH 4/8/2005     Cancer, Prostate 3/25/2011     Chronic prostatitis 9/5/2007     Diabetes Mellitus, Type 2 9/29/2004     Dyslipidemia 3/25/2011     Eczema 3/25/2011     Erectile Dysfunction 3/25/2011     Mixed hyperlipidemia      Nephrolithiasis 8/24/2011     TERESO on CPAP      Osteoarthritis, Generalized 3/25/2011     Past Surgical History:   Procedure Laterality Date     BIOPSY PROSTATE TRANSRECTAL       cataract extraction      right     COLONOSCOPY      2006     COLONOSCOPY  4-     excision of acrochordon       excision of peritonsillar abscess       kidney stones blasted  2011     laparoscopic bilateral inguinal hernia repair  8/2011    Bilateral inguinal hernia     prostatectomy  8/2011    Adenocarcinoma of the prostate     shoulder arthroscopy with Pam procedure      right     VASECTOMY       Family History   Problem Relation Age of Onset     Prostate Cancer Father      cause of death     DIABETES Father      Hypertension Father      Other - See Comments Father      renal disease     CEREBROVASCULAR DISEASE Mother      CVA     DIABETES Paternal Grandfather      Glaucoma Brother      Asthma No family hx of      Social History     Social History     Marital status:      Spouse name: N/A     Number of children: N/A     Years of education: N/A     Occupational History      Retired      Retired     Social History Main Topics     Smoking status: Former Smoker     Types: Cigarettes     Quit date: 1/29/1969     Smokeless tobacco: Never Used      Comment: no passive smoke exposure     Alcohol use Not on file      Comment: socially     Drug use: No     Sexual activity: Not on file     Other Topics Concern      Service No     Blood Transfusions Yes     Permits if needed     Caffeine Concern Yes     2 cups coffee daily     Occupational Exposure No     Hobby Hazards No     Sleep Concern Yes     takes benadryl     Stress Concern No     Weight Concern No     Special Diet No      "Back Care No     Exercise No     Seat Belt No     Self-Exams No     Parent/Sibling W/ Cabg, Mi Or Angioplasty Before 65f 55m? No     Social History Narrative         Review Of Systems  Constitutional, HEENT, cardiovascular, pulmonary, gi and gu systems are negative, except as otherwise noted.    OBJECTIVE:  /64 (BP Location: Left arm, Patient Position: Sitting, Cuff Size: Adult Regular)  Pulse 73  Temp 98.2  F (36.8  C) (Tympanic)  Resp 16  Ht 5' 11\" (1.803 m)  Wt 213 lb (96.6 kg)  SpO2 95%  BMI 29.71 kg/m2      Exam:  Physical Exam   Constitutional: He is oriented to person, place, and time. He appears well-developed and well-nourished. No distress.   Neurological: He is alert and oriented to person, place, and time.   Psychiatric: He has a normal mood and affect.     Other exam not repeated    Labs:  Telephone on 05/23/2017   Component Date Value Ref Range Status     Hemoglobin A1C 05/23/2017 6.9* 4.3 - 6.0 % Final     WBC 05/23/2017 5.1  4.0 - 11.0 10e9/L Final     RBC Count 05/23/2017 4.99  4.4 - 5.9 10e12/L Final     Hemoglobin 05/23/2017 15.5  13.3 - 17.7 g/dL Final     Hematocrit 05/23/2017 45.4  40.0 - 53.0 % Final     MCV 05/23/2017 91  78 - 100 fl Final     MCH 05/23/2017 31.1  26.5 - 33.0 pg Final     MCHC 05/23/2017 34.1  31.5 - 36.5 g/dL Final     RDW 05/23/2017 13.0  10.0 - 15.0 % Final     Platelet Count 05/23/2017 230  150 - 450 10e9/L Final     Diff Method 05/23/2017 Automated Method   Final     % Neutrophils 05/23/2017 47.7  % Final     % Lymphocytes 05/23/2017 39.4  % Final     % Monocytes 05/23/2017 8.3  % Final     % Eosinophils 05/23/2017 3.4  % Final     % Basophils 05/23/2017 1.0  % Final     % Immature Granulocytes 05/23/2017 0.2  % Final     Nucleated RBCs 05/23/2017 0  0 /100 Final     Absolute Neutrophil 05/23/2017 2.4  1.6 - 8.3 10e9/L Final     Absolute Lymphocytes 05/23/2017 2.0  0.8 - 5.3 10e9/L Final     Absolute Monocytes 05/23/2017 0.4  0.0 - 1.3 10e9/L Final     " Absolute Eosinophils 05/23/2017 0.2  0.0 - 0.7 10e9/L Final     Absolute Basophils 05/23/2017 0.1  0.0 - 0.2 10e9/L Final     Abs Immature Granulocytes 05/23/2017 0.0  0 - 0.4 10e9/L Final     Absolute Nucleated RBC 05/23/2017 0.0   Final     Sodium 05/23/2017 141  133 - 144 mmol/L Final     Potassium 05/23/2017 4.4  3.4 - 5.3 mmol/L Final     Chloride 05/23/2017 108  94 - 109 mmol/L Final     Carbon Dioxide 05/23/2017 25  20 - 32 mmol/L Final     Anion Gap 05/23/2017 8  3 - 14 mmol/L Final     Glucose 05/23/2017 100* 70 - 99 mg/dL Final     Urea Nitrogen 05/23/2017 14  7 - 30 mg/dL Final     Creatinine 05/23/2017 0.98  0.66 - 1.25 mg/dL Final     GFR Estimate 05/23/2017 75  >60 mL/min/1.7m2 Final    Non  GFR Calc     GFR Estimate If Black 05/23/2017   >60 mL/min/1.7m2 Final                    Value:>90   GFR Calc       Calcium 05/23/2017 8.2* 8.5 - 10.1 mg/dL Final     Bilirubin Total 05/23/2017 1.1  0.2 - 1.3 mg/dL Final     Albumin 05/23/2017 3.1* 3.4 - 5.0 g/dL Final     Protein Total 05/23/2017 7.4  6.8 - 8.8 g/dL Final     Alkaline Phosphatase 05/23/2017 92  40 - 150 U/L Final     ALT 05/23/2017 16  0 - 70 U/L Final     AST 05/23/2017 12  0 - 45 U/L Final     Color Urine 05/23/2017 Light Yellow   Final     Appearance Urine 05/23/2017 Clear   Final     Glucose Urine 05/23/2017 Negative  NEG mg/dL Final     Bilirubin Urine 05/23/2017 Negative  NEG Final     Ketones Urine 05/23/2017 Negative  NEG mg/dL Final     Specific Gravity Urine 05/23/2017 1.008  1.003 - 1.035 Final     Blood Urine 05/23/2017 Negative  NEG Final     pH Urine 05/23/2017 6.5  4.7 - 8.0 pH Final     Protein Albumin Urine 05/23/2017 Negative  NEG mg/dL Final     Urobilinogen mg/dL 05/23/2017 Normal  0.0 - 2.0 mg/dL Final     Nitrite Urine 05/23/2017 Negative  NEG Final     Leukocyte Esterase Urine 05/23/2017 Negative  NEG Final     Source 05/23/2017 Midstream Urine   Final     Creatinine Urine 05/23/2017 50   mg/dL Final     Albumin Urine mg/L 05/23/2017 6  mg/L Final     Albumin Urine mg/g Cr 05/23/2017 11.79  0 - 17 mg/g Cr Final     TSH 05/23/2017 1.46  0.40 - 4.00 mU/L Final     PSA 05/23/2017   0 - 4 ug/L Final                    Value:<0.01  Assay Method:  Chemiluminescence using Siemens Vista analyzer       Cholesterol 05/23/2017 155  <200 mg/dL Final     Triglycerides 05/23/2017 94  <150 mg/dL Final     HDL Cholesterol 05/23/2017 47  >39 mg/dL Final     LDL Cholesterol Calculated 05/23/2017 89  <100 mg/dL Final    Desirable:       <100 mg/dl     Non HDL Cholesterol 05/23/2017 108  <130 mg/dL Final     Estimated Average Glucose 05/23/2017 151  mg/dL Final       ASSESSMENT/PLAN:  TERESO (obstructive sleep apnea)  CPAP supplies written.  He will continue current settings.  Routine follow up.  - order for DME; Equipment being ordered: CPAP supplies            Rodolfo Cisneros MD

## 2018-02-13 NOTE — MR AVS SNAPSHOT
"              After Visit Summary   2018    Efrain Weiner    MRN: [id number]           Patient Information     Date Of Birth          1940        Visit Information        Provider Department      2018 3:00 PM Rodolfo Cisneros MD [facility]        Today's Diagnoses     TERESO (obstructive sleep apnea)    -  1      Care Instructions    Suppies written          Follow-ups after your visit        Follow-up notes from your care team     Return if symptoms worsen or fail to improve.      Who to contact     If you have questions or need follow up information about today's clinic visit or your schedule please contact [facility] directly at [phone].  Normal or non-critical lab and imaging results will be communicated to you by MyChart, letter or phone within 4 business days after the clinic has received the results. If you do not hear from us within 7 days, please contact the clinic through MyChart or phone. If you have a critical or abnormal lab result, we will notify you by phone as soon as possible.  Submit refill requests through [employer] or call your pharmacy and they will forward the refill request to us. Please allow 3 business days for your refill to be completed.          Additional Information About Your Visit        MyChart Information     [employer] lets you send messages to your doctor, view your test results, renew your prescriptions, schedule appointments and more. To sign up, go to [email or web address] . Click on \"Log in\" on the left side of the screen, which will take you to the Welcome page. Then click on \"Sign up Now\" on the right side of the page.     You will be asked to enter the access code listed below, as well as some personal information. Please follow the directions to create your username and password.     Your access code is: [email or web address]  Expires: 2018  6:33 AM     Your access code will  in 90 days. If you need help or a new code, please " "call your [facility] clinic or [phone].        Care EveryWhere ID     This is your Care EveryWhere ID. This could be used by other organizations to access your [facility] medical records  [id number]        Your Vitals Were     Pulse Temperature Respirations Height Pulse Oximetry BMI (Body Mass Index)    73 98.2  F (36.8  C) (Tympanic) 16 5' 11\" (1.803 m) 95% 29.71 kg/m2       Blood Pressure from Last 3 Encounters:   02/13/18 132/64   06/07/17 146/78   05/25/17 162/93    Weight from Last 3 Encounters:   02/13/18 213 lb (96.6 kg)   06/07/17 213 lb (96.6 kg)   05/25/17 217 lb (98.4 kg)              Today, you had the following     No orders found for display         Today's Medication Changes          These changes are accurate as of 2/13/18 11:59 PM.  If you have any questions, ask your nurse or doctor.               Start taking these medicines.        Dose/Directions    order for DME   Used for:  TERESO (obstructive sleep apnea)   Started by:  Rodolfo Cisneros MD        Equipment being ordered: CPAP supplies   Quantity:  1 Device   Refills:  0            Where to get your medicines      Some of these will need a paper prescription and others can be bought over the counter.  Ask your nurse if you have questions.     Bring a paper prescription for each of these medications     order for DME                Primary Care Provider Office Phone # Fax #    Rodolfo Cisneros [phone]       [address] [place] [address]        Equal Access to Services     LEONOR SEN AH: Hadii ming pollock hadasho Socatali, waaxda luqadaha, qaybta kaalmada adeegyada, favian vides. So [facility] [phone].    ATENCIÓN: Si habla español, tiene a ni disposición servicios gratuitos de asistencia lingüística. Llame al [phone].    We comply with applicable federal civil rights laws and [place] laws. We do not discriminate on the basis of race, color, national origin, age, disability, sex, " sexual orientation, or gender identity.            Thank you!     Thank you for choosing [facility]  for your care. Our goal is always to provide you with excellent care. Hearing back from our patients is one way we can continue to improve our services. Please take a few minutes to complete the written survey that you may receive in the mail after your visit with us. Thank you!             Your Updated Medication List - Protect others around you: Learn how to safely use, store and throw away your medicines at www.disposemymeds.org.          This list is accurate as of 2/13/18 11:59 PM.  Always use your most recent med list.                   Brand Name Dispense Instructions for use Diagnosis    blood glucose monitoring lancets     102 each    Use to test blood sugar 1 time daily or as directed.    Diabetes mellitus, type 2 (H)       blood glucose monitoring meter device kit     1 kit    Use to test blood sugars 1 time daily or as directed.    Diabetes mellitus, type 2 (H)       blood glucose monitoring test strip    ACCU-CHEK LIZETTE    1 Box    Use to test blood sugars 1 time daily or as directed.    Diabetes mellitus, type 2 (H)       Buffered Aspirin 325 MG Tabs      daily        DiphenhydrAMINE HCl (Sleep) 25 MG Caps      Take 1 tablet by mouth bedtime        GLUCOSAMINE CHONDRO COMPLEX OR      daily        ibuprofen 200 MG tablet    ADVIL/MOTRIN     Take 1 tablet by mouth every 6 hours as needed. With food        metFORMIN 500 MG tablet    GLUCOPHAGE    60 tablet    Take one (1) tablet BY MOUTH twice daily WITH MEALS    Controlled type 2 diabetes mellitus without complication, without long-term current use of insulin (H)       nabumetone 750 MG tablet    RELAFEN    180 tablet    Take one (1) tablet BY MOUTH twice daily with food    Osteoarthritis, unspecified osteoarthritis type, unspecified site       order for DME     1 Device    Equipment being ordered: CPAP supplies    TERESO (obstructive sleep  apnea)       pseudoePHEDrine 60 MG tablet    SUDAFED     daily        simvastatin 40 MG tablet    ZOCOR    45 tablet    Take one (1) tablet BY MOUTH every other day    Hyperlipidemia       vitamin D 2000 UNITS tablet     100 tablet    Take 2,000 Units by mouth daily    Vitamin D deficiency

## 2018-02-13 NOTE — NURSING NOTE
"Chief Complaint   Patient presents with     CPAP Follow Up     Needs new cpap machine.  Needs a prescription for a new machine, tubing, filters and supplies.       Initial /64 (BP Location: Left arm, Patient Position: Sitting, Cuff Size: Adult Regular)  Pulse 73  Temp 98.2  F (36.8  C) (Tympanic)  Resp 16  Ht 5' 11\" (1.803 m)  Wt 213 lb (96.6 kg)  SpO2 95%  BMI 29.71 kg/m2 Estimated body mass index is 29.71 kg/(m^2) as calculated from the following:    Height as of this encounter: 5' 11\" (1.803 m).    Weight as of this encounter: 213 lb (96.6 kg).  Medication Reconciliation: complete     Ina Duvall      "

## 2018-02-14 ASSESSMENT — PATIENT HEALTH QUESTIONNAIRE - PHQ9: SUM OF ALL RESPONSES TO PHQ QUESTIONS 1-9: 1

## 2018-02-14 ASSESSMENT — ANXIETY QUESTIONNAIRES: GAD7 TOTAL SCORE: 0

## 2018-02-23 ENCOUNTER — TELEPHONE (OUTPATIENT)
Dept: FAMILY MEDICINE | Facility: OTHER | Age: 78
End: 2018-02-23

## 2018-02-23 DIAGNOSIS — G47.33 OSA (OBSTRUCTIVE SLEEP APNEA): ICD-10-CM

## 2018-02-23 NOTE — TELEPHONE ENCOUNTER
Patient called and states he needs a new mask for cpap. DME order was sent to [facility] instead of medical supplies. kPended new one for you.

## 2018-02-27 ENCOUNTER — MEDICAL CORRESPONDENCE (OUTPATIENT)
Dept: HEALTH INFORMATION MANAGEMENT | Facility: HOSPITAL | Age: 78
End: 2018-02-27

## 2018-07-03 DIAGNOSIS — E11.9 CONTROLLED TYPE 2 DIABETES MELLITUS WITHOUT COMPLICATION, WITHOUT LONG-TERM CURRENT USE OF INSULIN (H): ICD-10-CM

## 2018-07-03 NOTE — LETTER
July 5, 2018      Efrain Weiner  [address] [place] [id number]        Dear Efrain,     APPOINTMENT REMINDER:   Our records indicates that it is time for you to be seen for a diabetic follow-up and labs.      Your current medication request for metformin will be approved for one refill but you will need to be seen before any additional refills can be approved. Taking care of your health is important to us, and ongoing visits with your provider are vital to your care.    We look forward to seeing you in the near future.  You may call our office at [phone] to schedule a visit.     Please disregard this notice if you have already made an appointment.        Sincerely,  Dr. Amelia MD

## 2018-07-05 NOTE — TELEPHONE ENCOUNTER
Biguanide Agents Failed  metFORMIN (GLUCOPHAGE) 500 MG tablet [Pharmacy Med Name: METFORMIN  MG TABLET]       Rerun Protocol (7/3/2018 12:12 PM)        Patient has documented LDL within the past 12 mos.           Recent Labs   Lab Test  05/23/17   0953   LDL  89                  Patient has documented A1c within the specified period of time.       If HgbA1C is 8 or greater, it needs to be on file within the past 3 months.  If less than 8, must be on file within the past 6 months.          Recent Labs   Lab Test  05/23/17   0953   A1C  6.9*            Skye please call pt due for diabetic f/u thank you

## 2018-08-08 ENCOUNTER — TELEPHONE (OUTPATIENT)
Dept: FAMILY MEDICINE | Facility: OTHER | Age: 78
End: 2018-08-08

## 2018-08-08 DIAGNOSIS — E11.21 CONTROLLED TYPE 2 DIABETES MELLITUS WITH DIABETIC NEPHROPATHY, WITHOUT LONG-TERM CURRENT USE OF INSULIN (H): Primary | ICD-10-CM

## 2018-08-08 NOTE — TELEPHONE ENCOUNTER
3:58 PM    Reason for Call: Phone Call    Description: Pt called and states that he made an appointment on 08/27/18 for his diabetes check and would like to see if he could come in for some labs before his appointment on this day.    Was an appointment offered for this call? No  If yes : Appointment type              Date    Preferred method for responding to this message: Telephone Call  What is your phone number ?[phone]    If we cannot reach you directly, may we leave a detailed response at the number you provided? Yes    Can this message wait until your PCP/provider returns, if available today? YES, PCP is out     Enriqueta Mcmahan

## 2018-08-09 NOTE — TELEPHONE ENCOUNTER
Called and notified patient that labs are entered and he will be able to come before his appointment to get the labs first.  Patient also requested to change appointment from 8/27/18 to 8/28/18.  Rescheduled appointment for patient.     Next 5 appointments (look out 90 days)     Aug 28, 2018 10:20 AM CDT   (Arrive by 10:05 AM)   SHORT with Rodolfo Cisneros MD   [facility] ([facility] )    [facility] [place] [address]   [phone]

## 2018-08-27 DIAGNOSIS — E11.21 CONTROLLED TYPE 2 DIABETES MELLITUS WITH DIABETIC NEPHROPATHY, WITHOUT LONG-TERM CURRENT USE OF INSULIN (H): ICD-10-CM

## 2018-08-27 LAB
ALBUMIN SERPL-MCNC: 3 G/DL (ref 3.4–5)
ALP SERPL-CCNC: 86 U/L (ref 40–150)
ALT SERPL W P-5'-P-CCNC: 24 U/L (ref 0–70)
ANION GAP SERPL CALCULATED.3IONS-SCNC: 5 MMOL/L (ref 3–14)
AST SERPL W P-5'-P-CCNC: 13 U/L (ref 0–45)
BASOPHILS # BLD AUTO: 0 10E9/L (ref 0–0.2)
BASOPHILS NFR BLD AUTO: 0.5 %
BILIRUB SERPL-MCNC: 0.9 MG/DL (ref 0.2–1.3)
BUN SERPL-MCNC: 19 MG/DL (ref 7–30)
CALCIUM SERPL-MCNC: 8.1 MG/DL (ref 8.5–10.1)
CHLORIDE SERPL-SCNC: 110 MMOL/L (ref 94–109)
CO2 SERPL-SCNC: 27 MMOL/L (ref 20–32)
CREAT SERPL-MCNC: 1.04 MG/DL (ref 0.66–1.25)
DIFFERENTIAL METHOD BLD: NORMAL
EOSINOPHIL # BLD AUTO: 0.2 10E9/L (ref 0–0.7)
EOSINOPHIL NFR BLD AUTO: 3.1 %
ERYTHROCYTE [DISTWIDTH] IN BLOOD BY AUTOMATED COUNT: 13.2 % (ref 10–15)
GFR SERPL CREATININE-BSD FRML MDRD: 69 ML/MIN/1.7M2
GLUCOSE SERPL-MCNC: 125 MG/DL (ref 70–99)
HCT VFR BLD AUTO: 44.6 % (ref 40–53)
HGB BLD-MCNC: 15.4 G/DL (ref 13.3–17.7)
IMM GRANULOCYTES # BLD: 0 10E9/L (ref 0–0.4)
IMM GRANULOCYTES NFR BLD: 0.2 %
LYMPHOCYTES # BLD AUTO: 2.2 10E9/L (ref 0.8–5.3)
LYMPHOCYTES NFR BLD AUTO: 38 %
MCH RBC QN AUTO: 32 PG (ref 26.5–33)
MCHC RBC AUTO-ENTMCNC: 34.5 G/DL (ref 31.5–36.5)
MCV RBC AUTO: 93 FL (ref 78–100)
MONOCYTES # BLD AUTO: 0.5 10E9/L (ref 0–1.3)
MONOCYTES NFR BLD AUTO: 8.1 %
NEUTROPHILS # BLD AUTO: 2.9 10E9/L (ref 1.6–8.3)
NEUTROPHILS NFR BLD AUTO: 50.1 %
NRBC # BLD AUTO: 0 10*3/UL
NRBC BLD AUTO-RTO: 0 /100
PLATELET # BLD AUTO: 188 10E9/L (ref 150–450)
POTASSIUM SERPL-SCNC: 4.2 MMOL/L (ref 3.4–5.3)
PROT SERPL-MCNC: 7.4 G/DL (ref 6.8–8.8)
RBC # BLD AUTO: 4.81 10E12/L (ref 4.4–5.9)
SODIUM SERPL-SCNC: 142 MMOL/L (ref 133–144)
TSH SERPL DL<=0.005 MIU/L-ACNC: 1.7 MU/L (ref 0.4–4)
WBC # BLD AUTO: 5.8 10E9/L (ref 4–11)

## 2018-08-27 PROCEDURE — 85025 COMPLETE CBC W/AUTO DIFF WBC: CPT | Mod: ZL | Performed by: FAMILY MEDICINE

## 2018-08-27 PROCEDURE — 80053 COMPREHEN METABOLIC PANEL: CPT | Mod: ZL | Performed by: FAMILY MEDICINE

## 2018-08-27 PROCEDURE — 83036 HEMOGLOBIN GLYCOSYLATED A1C: CPT | Mod: ZL | Performed by: FAMILY MEDICINE

## 2018-08-27 PROCEDURE — 84443 ASSAY THYROID STIM HORMONE: CPT | Mod: ZL | Performed by: FAMILY MEDICINE

## 2018-08-27 PROCEDURE — 36415 COLL VENOUS BLD VENIPUNCTURE: CPT | Mod: ZL | Performed by: FAMILY MEDICINE

## 2018-08-27 PROCEDURE — 40000788 ZZHCL STATISTIC ESTIMATED AVERAGE GLUCOSE: Mod: ZL | Performed by: FAMILY MEDICINE

## 2018-08-28 ENCOUNTER — OFFICE VISIT (OUTPATIENT)
Dept: FAMILY MEDICINE | Facility: OTHER | Age: 78
End: 2018-08-28
Attending: FAMILY MEDICINE
Payer: COMMERCIAL

## 2018-08-28 VITALS
TEMPERATURE: 96.2 F | WEIGHT: 205 LBS | HEART RATE: 59 BPM | OXYGEN SATURATION: 97 % | RESPIRATION RATE: 18 BRPM | HEIGHT: 71 IN | BODY MASS INDEX: 28.7 KG/M2 | SYSTOLIC BLOOD PRESSURE: 136 MMHG | DIASTOLIC BLOOD PRESSURE: 60 MMHG

## 2018-08-28 DIAGNOSIS — E11.9 TYPE 2 DIABETES MELLITUS WITHOUT COMPLICATION, WITHOUT LONG-TERM CURRENT USE OF INSULIN (H): Primary | ICD-10-CM

## 2018-08-28 DIAGNOSIS — C61 PROSTATE CANCER (H): ICD-10-CM

## 2018-08-28 DIAGNOSIS — E78.2 MIXED HYPERLIPIDEMIA: ICD-10-CM

## 2018-08-28 LAB
EST. AVERAGE GLUCOSE BLD GHB EST-MCNC: 157 MG/DL
HBA1C MFR BLD: 7.1 % (ref 0–5.6)

## 2018-08-28 PROCEDURE — G0463 HOSPITAL OUTPT CLINIC VISIT: HCPCS

## 2018-08-28 PROCEDURE — 99214 OFFICE O/P EST MOD 30 MIN: CPT | Performed by: FAMILY MEDICINE

## 2018-08-28 ASSESSMENT — ANXIETY QUESTIONNAIRES
6. BECOMING EASILY ANNOYED OR IRRITABLE: NOT AT ALL
7. FEELING AFRAID AS IF SOMETHING AWFUL MIGHT HAPPEN: NOT AT ALL
2. NOT BEING ABLE TO STOP OR CONTROL WORRYING: NOT AT ALL
4. TROUBLE RELAXING: NOT AT ALL
5. BEING SO RESTLESS THAT IT IS HARD TO SIT STILL: NOT AT ALL
GAD7 TOTAL SCORE: 0
3. WORRYING TOO MUCH ABOUT DIFFERENT THINGS: NOT AT ALL
1. FEELING NERVOUS, ANXIOUS, OR ON EDGE: NOT AT ALL

## 2018-08-28 ASSESSMENT — PAIN SCALES - GENERAL: PAINLEVEL: NO PAIN (0)

## 2018-08-28 NOTE — LETTER
August 29, 2018      Efrain Weiner  [address] [place] [phone]        Dear ,    We are writing to inform you of your test results.    Your test results fall within the expected range(s) or remain unchanged from previous results.  Please continue with current treatment plan.    Resulted Orders   Hemoglobin A1c   Result Value Ref Range    Hemoglobin A1C 7.1 (H) 0 - 5.6 %      Comment:      Normal <5.7% Prediabetes 5.7-6.4%  Diabetes 6.5% or higher - adopted from ADA   consensus guidelines.     Estimated Average Glucose   Result Value Ref Range    Estimated Average Glucose 157 mg/dL       If you have any questions or concerns, please call the clinic at the number listed above.       Sincerely,        Rodolfo Cisneros MD

## 2018-08-28 NOTE — PROGRESS NOTES
SUBJECTIVE:   Efrain Weiner is a 78 year old male who presents to clinic today for the following health issues:    Diabetes Follow-up      Patient is checking blood sugars: rarely.  Results range from 120-150s    Diabetic concerns: None     Symptoms of hypoglycemia (low blood sugar): none     Paresthesias (numbness or burning in feet) or sores: No     Date of last diabetic eye exam: unknown    BP Readings from Last 2 Encounters:   08/28/18 136/60   02/13/18 132/64     Hemoglobin A1C (%)   Date Value   05/23/2017 6.9 (H)   06/01/2016 6.9 (H)     LDL Cholesterol Calculated (mg/dL)   Date Value   05/23/2017 89   06/01/2016 73       Diabetes Management Resources    Amount of exercise or physical activity: 2-3 days/week for an average of 30 minutes    Problems taking medications regularly: No    Medication side effects: none    Diet: regular (no restrictions)        Hyperlipidemia Follow-Up      Rate your low fat/cholesterol diet?: not monitoring fat    Taking statin?  Yes, no muscle aches from statin    Other lipid medications/supplements?:  none      Amount of exercise or physical activity: 2-3 days/week for an average of 30 minutes    Problems taking medications regularly: No    Medication side effects: none    Diet: regular (no restrictions)           Prostate cancer      Duration: Prior    Description (location/character/radiation): Adenocarcinoma    Intensity:  NA    Accompanying signs and symptoms: Nkne    History (similar episodes/previous evaluation): None    Precipitating or alleviating factors: None    Therapies tried and outcome: Prostatectomu       Problem list and histories reviewed & adjusted, as indicated.  Additional history: as documented    Patient Active Problem List   Diagnosis     Prostate cancer (H)     Dyslipidemia     Type 2 diabetes mellitus without complication (H)     BPH (benign prostatic hyerplasia)     Osteoarthrosis, multiple sites     ED (erectile dysfunction)     TERESO (obstructive  sleep apnea)     H/O nephrolithiasis     S/P total knee replacement, left     S/P total knee replacement, right     S/P hip replacement, right     Past Surgical History:   Procedure Laterality Date     BIOPSY PROSTATE TRANSRECTAL       cataract extraction      right     COLONOSCOPY      2006     COLONOSCOPY  4-     excision of acrochordon       excision of peritonsillar abscess       kidney stones blasted  2011     laparoscopic bilateral inguinal hernia repair  8/2011    Bilateral inguinal hernia     prostatectomy  8/2011    Adenocarcinoma of the prostate     shoulder arthroscopy with Pam procedure      right     VASECTOMY         Social History   Substance Use Topics     Smoking status: Former Smoker     Types: Cigarettes     Quit date: 1/29/1969     Smokeless tobacco: Never Used      Comment: no passive smoke exposure     Alcohol use Not on file      Comment: socially     Family History   Problem Relation Age of Onset     Prostate Cancer Father      cause of death     Diabetes Father      Hypertension Father      Other - See Comments Father      renal disease     Cerebrovascular Disease Mother      CVA     Diabetes Paternal Grandfather      Glaucoma Brother      Asthma No family hx of          Current Outpatient Prescriptions   Medication Sig Dispense Refill     ACCU-CHEK MULTICLIX LANCETS MISC Use to test blood sugar 1 time daily or as directed. 102 each 8     blood glucose monitoring (ACCU-CHEK LIZETTE) test strip Use to test blood sugars 1 time daily or as directed. 1 Box 3     Blood Glucose Monitoring Suppl (ACCU-CHEK LIZETTE PLUS) W/DEVICE KIT Use to test blood sugars 1 time daily or as directed. 1 kit 0     BUFFERED ASPIRIN 325 MG OR TABS daily       Cholecalciferol (VITAMIN D) 2000 UNITS tablet Take 2,000 Units by mouth daily 100 tablet 3     DiphenhydrAMINE HCl, Sleep, 25 MG CAPS Take 1 tablet by mouth bedtime       ibuprofen (ADVIL,MOTRIN) 200 MG tablet Take 1 tablet by mouth every 6 hours as  "needed. With food       metFORMIN (GLUCOPHAGE) 500 MG tablet TAKE ONE (1) TABLET BY MOUTH TWICE DAILY WITH MEALS 60 tablet 0     nabumetone (RELAFEN) 750 MG tablet TAKE ONE (1) TABLET BY MOUTH TWICE DAILY WITH FOOD 180 tablet 0     order for DME Equipment being ordered: CPAP supplies 1 Device 0     PSEUDOEPHEDRINE HCL 60 MG OR TABS daily       simvastatin (ZOCOR) 40 MG tablet TAKE ONE (1) TABLET BY MOUTH EVERY OTHER DAY 45 tablet 0     Allergies   Allergen Reactions     Nkda [No Known Drug Allergies]      Recent Labs   Lab Test  08/27/18   0837  05/23/17   0953  06/01/16   0818   03/20/15   0900   A1C   --   6.9*  6.9*   --   6.6*   LDL   --   89  73   --   107   HDL   --   47  51   --   55   TRIG   --   94  84   --   82   ALT  24  16  17   --   13   CR  1.04  0.98  0.97   < >  1.02   GFRESTIMATED  69  75  75   < >  71   GFRESTBLACK  84  >90   GFR Calc    >90   GFR Calc     < >  86   POTASSIUM  4.2  4.4  4.2   < >  4.3   TSH  1.70  1.46  1.35   --    --     < > = values in this interval not displayed.      BP Readings from Last 3 Encounters:   08/28/18 136/60   02/13/18 132/64   06/07/17 146/78    Wt Readings from Last 3 Encounters:   08/28/18 205 lb (93 kg)   02/13/18 213 lb (96.6 kg)   06/07/17 213 lb (96.6 kg)                    Reviewed and updated as needed this visit by clinical staff  Tobacco  Allergies  Meds  Problems  Med Hx  Surg Hx  Fam Hx  Soc Hx        Reviewed and updated as needed this visit by Provider         ROS:  Constitutional, HEENT, cardiovascular, pulmonary, gi and gu systems are negative, except as otherwise noted.    OBJECTIVE:     /60 (BP Location: Left arm, Patient Position: Sitting, Cuff Size: Adult Regular)  Pulse 59  Temp 96.2  F (35.7  C) (Tympanic)  Resp 18  Ht 5' 11\" (1.803 m)  Wt 205 lb (93 kg)  SpO2 97%  BMI 28.59 kg/m2  Body mass index is 28.59 kg/(m^2).  Physical Exam   Constitutional: He is oriented to person, place, and time. " He appears well-developed and well-nourished. No distress.   HENT:   Head: Normocephalic.   Right Ear: Tympanic membrane, external ear and ear canal normal.   Left Ear: Tympanic membrane, external ear and ear canal normal.   Nose: Nose normal.   Mouth/Throat: Oropharynx is clear and moist.   Eyes: Conjunctivae are normal.   Neck: Neck supple. No JVD present. No thyromegaly present.   Cardiovascular: Normal rate, regular rhythm, normal heart sounds and intact distal pulses.  Exam reveals no gallop and no friction rub.    No murmur heard.  Pulmonary/Chest: Effort normal and breath sounds normal. He has no rales.   Musculoskeletal: He exhibits no edema.   Neurological: He is alert and oriented to person, place, and time.   Skin: Skin is warm and dry.   Psychiatric: He has a normal mood and affect.       Other exam not repeated.  Diagnostic Test Results:  Results for orders placed or performed in visit on 08/28/18   Hemoglobin A1c   Result Value Ref Range    Hemoglobin A1C 7.1 (H) 0 - 5.6 %   Estimated Average Glucose   Result Value Ref Range    Estimated Average Glucose 157 mg/dL       ASSESSMENT/PLAN:     Type 2 diabetes mellitus without complication, without long-term current use of insulin (H)  Fasting labs are reviewed.  Goal of hemoglobin A1C of less than 7 discussed.  Instructed in the importance of diet, exercise, and good glycemic control in prevention of secondary complications.    Continue same medication regimen. Repeat fasting glucose and hemoglobin A1C in 6 months.    - Hemoglobin A1c  - Estimated Average Glucose    Dyslipidemia  Fasting labs reviewed.  Goals discussed.  Discussed the importance of diet, exercise, and weight reduction.  Follow up 12 months.     Prostate cancer (H)  Stable.  He is followed by Urology.  Routine follow up             Rodolfo Cisneros MD  [facility]

## 2018-08-29 ASSESSMENT — PATIENT HEALTH QUESTIONNAIRE - PHQ9: SUM OF ALL RESPONSES TO PHQ QUESTIONS 1-9: 2

## 2018-08-29 ASSESSMENT — ANXIETY QUESTIONNAIRES: GAD7 TOTAL SCORE: 0

## 2018-09-10 DIAGNOSIS — E78.5 HYPERLIPIDEMIA: ICD-10-CM

## 2018-09-10 DIAGNOSIS — E11.9 CONTROLLED TYPE 2 DIABETES MELLITUS WITHOUT COMPLICATION, WITHOUT LONG-TERM CURRENT USE OF INSULIN (H): ICD-10-CM

## 2018-09-10 DIAGNOSIS — M19.90 OSTEOARTHRITIS, UNSPECIFIED OSTEOARTHRITIS TYPE, UNSPECIFIED SITE: ICD-10-CM

## 2018-09-11 RX ORDER — SIMVASTATIN 40 MG
TABLET ORAL
Qty: 45 TABLET | Refills: 0 | Status: SHIPPED | OUTPATIENT
Start: 2018-09-11 | End: 2018-11-02

## 2018-09-11 NOTE — TELEPHONE ENCOUNTER
Metformin       Last Written Prescription Date:  7/05/2018  Last Fill Quantity: 60,   # refills: 0  Last Office Visit: 8/28/2018  Future Office visit:

## 2018-09-11 NOTE — TELEPHONE ENCOUNTER
Relafen       Last Written Prescription Date:  5/25/2018  Last Fill Quantity: 180,   # refills: 0  Last Office Visit: 8/28/2018  Future Office visit:         Zocor       Last Written Prescription Date:  5/07/2018  Last Fill Quantity: 45,   # refills: 0  Last Office Visit: 8/28/2018  Future Office visit:

## 2018-11-02 DIAGNOSIS — E78.5 DYSLIPIDEMIA: Primary | ICD-10-CM

## 2018-11-02 DIAGNOSIS — E11.9 CONTROLLED TYPE 2 DIABETES MELLITUS WITHOUT COMPLICATION, WITHOUT LONG-TERM CURRENT USE OF INSULIN (H): ICD-10-CM

## 2018-11-02 DIAGNOSIS — E78.5 HYPERLIPIDEMIA: ICD-10-CM

## 2018-11-02 RX ORDER — SIMVASTATIN 40 MG
TABLET ORAL
Qty: 45 TABLET | Refills: 0 | Status: SHIPPED | OUTPATIENT
Start: 2018-11-02 | End: 2019-05-22

## 2018-11-02 NOTE — TELEPHONE ENCOUNTER
Protocol failed due to   LDL on file in past 12 months  LDL Cholesterol Calculated   Date Value Ref Range Status   05/23/2017 89 <100 mg/dL Final     Comment:     Desirable:       <100 mg/dl     Please advise. Thank you!

## 2018-11-02 NOTE — TELEPHONE ENCOUNTER
metFORMIN (GLUCOPHAGE) 500 MG tablet      Last Written Prescription Date:  9/11/18  Last Fill Quantity: 60,   # refills: 0  Last Office Visit: 8/8/18  Future Office visit:

## 2018-11-02 NOTE — TELEPHONE ENCOUNTER
Protocol failed due to    Patient has documented LDL within the past 12 mos.    Patient has had a Microalbumin in the past 15 mos.     LDL Cholesterol Calculated   Date Value Ref Range Status   05/23/2017 89 <100 mg/dL Final     Comment:     Desirable:       <100 mg/dl     Please advise. Thank you!

## 2018-11-02 NOTE — TELEPHONE ENCOUNTER
simvastatin (ZOCOR) 40 MG tablet     Last Written Prescription Date:  9/11/18  Last Fill Quantity: 45,   # refills: 0  Last Office Visit: 8/28/18  Future Office visit:

## 2018-11-05 NOTE — TELEPHONE ENCOUNTER
"Pt called to confirm \"I take my metformin one tablet,  twice a day.\" \"Dr. Cisneros called and left VM wondering how I take it.\"   "

## 2019-01-03 ENCOUNTER — HOSPITAL ENCOUNTER (EMERGENCY)
Facility: HOSPITAL | Age: 79
Discharge: HOME | End: 2019-01-03
Attending: NURSE PRACTITIONER | Admitting: NURSE PRACTITIONER
Payer: MEDICARE

## 2019-01-03 VITALS
BODY MASS INDEX: 29.4 KG/M2 | SYSTOLIC BLOOD PRESSURE: 167 MMHG | WEIGHT: 210 LBS | TEMPERATURE: 96.2 F | OXYGEN SATURATION: 94 % | HEIGHT: 71 IN | RESPIRATION RATE: 20 BRPM | DIASTOLIC BLOOD PRESSURE: 84 MMHG | HEART RATE: 84 BPM

## 2019-01-03 DIAGNOSIS — J01.00 ACUTE MAXILLARY SINUSITIS, RECURRENCE NOT SPECIFIED: ICD-10-CM

## 2019-01-03 DIAGNOSIS — Z87.891 PERSONAL HISTORY OF TOBACCO USE, PRESENTING HAZARDS TO HEALTH: ICD-10-CM

## 2019-01-03 PROCEDURE — G0463 HOSPITAL OUTPT CLINIC VISIT: HCPCS

## 2019-01-03 PROCEDURE — 99213 OFFICE O/P EST LOW 20 MIN: CPT | Mod: Z6 | Performed by: NURSE PRACTITIONER

## 2019-01-03 RX ORDER — FLUTICASONE PROPIONATE 50 MCG
1 SPRAY, SUSPENSION (ML) NASAL DAILY
Qty: 1 BOTTLE | Refills: 3 | Status: SHIPPED | OUTPATIENT
Start: 2019-01-03 | End: 2019-04-05

## 2019-01-03 ASSESSMENT — ENCOUNTER SYMPTOMS
CHILLS: 1
HEMATOLOGIC/LYMPHATIC NEGATIVE: 1
ALLERGIC/IMMUNOLOGIC NEGATIVE: 1
ENDOCRINE NEGATIVE: 1
FATIGUE: 1
EYES NEGATIVE: 1
SINUS PRESSURE: 1
CARDIOVASCULAR NEGATIVE: 1
COUGH: 1
PSYCHIATRIC NEGATIVE: 1
ROS SKIN COMMENTS: RIGHT FOREARM
SHORTNESS OF BREATH: 1
SINUS PAIN: 1
GASTROINTESTINAL NEGATIVE: 1
HEADACHES: 1
MUSCULOSKELETAL NEGATIVE: 1
FEVER: 1

## 2019-01-03 ASSESSMENT — MIFFLIN-ST. JEOR: SCORE: 1699.68

## 2019-01-03 NOTE — ED PROVIDER NOTES
History     Chief Complaint   Patient presents with     Sinusitis     X 1 week     The history is provided by the patient.     Efrain Weiner is a 78 year old male who presents to the  not feeling well. Art states he thought he started with a cold, but feels it has changed to a sinus infection. He states his symptoms started about a week ago, but are progressively getting worse. Denies any recent antibiotic use.     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     Priority: Medium     S/P total knee replacement, right 06/07/2016     Priority: Medium     S/P hip replacement, right 06/07/2016     Priority: Medium     H/O nephrolithiasis 06/01/2016     Priority: Medium     Dyslipidemia      Priority: Medium     TERESO (obstructive sleep apnea)      Priority: Medium     Prostate cancer (H) 04/26/2013     Priority: Medium     08/2011  S/P DaVinci prostatectomy       Osteoarthrosis, multiple sites 03/25/2011     Priority: Medium     ED (erectile dysfunction) 03/25/2011     Priority: Medium     BPH (benign prostatic hyerplasia) 04/08/2005     Priority: Medium     Type 2 diabetes mellitus without complication (H) 09/29/2004     Priority: Medium        Past Medical History:    Past Medical History:   Diagnosis Date     Allergic rhinitis, seasonal 3/25/2011     BPH 4/8/2005     Cancer, Prostate 3/25/2011     Chronic prostatitis 9/5/2007     Diabetes Mellitus, Type 2 9/29/2004     Dyslipidemia 3/25/2011     Eczema 3/25/2011     Erectile Dysfunction 3/25/2011     Mixed hyperlipidemia      Nephrolithiasis 8/24/2011     TERESO on CPAP      Osteoarthritis, Generalized 3/25/2011       Past Surgical History:    Past Surgical History:   Procedure Laterality Date     BIOPSY PROSTATE TRANSRECTAL       cataract extraction      right     COLONOSCOPY      2006     COLONOSCOPY  4-     excision of acrochordon       excision of peritonsillar abscess       kidney  stones blasted       laparoscopic bilateral inguinal hernia repair  2011    Bilateral inguinal hernia     prostatectomy  2011    Adenocarcinoma of the prostate     shoulder arthroscopy with Pam procedure      right     VASECTOMY         Family History:    Family History   Problem Relation Age of Onset     Prostate Cancer Father         cause of death     Diabetes Father      Hypertension Father      Other - See Comments Father         renal disease     Cerebrovascular Disease Mother         CVA     Diabetes Paternal Grandfather      Glaucoma Brother      Asthma No family hx of        Social History:  Marital Status:   [2]  Social History     Tobacco Use     Smoking status: Former Smoker     Types: Cigarettes     Last attempt to quit: 1969     Years since quittin.9     Smokeless tobacco: Never Used     Tobacco comment: no passive smoke exposure   Substance Use Topics     Alcohol use: Not on file     Comment: socially     Drug use: No        Medications:      ACCU-CHEK MULTICLIX LANCETS MISC   amoxicillin-clavulanate (AUGMENTIN) 875-125 MG tablet   blood glucose monitoring (ACCU-CHEK LIZETTE) test strip   Blood Glucose Monitoring Suppl (ACCU-CHEK LIZETTE PLUS) W/DEVICE KIT   BUFFERED ASPIRIN 325 MG OR TABS   Cholecalciferol (VITAMIN D) 2000 UNITS tablet   DiphenhydrAMINE HCl, Sleep, 25 MG CAPS   fluticasone (FLONASE) 50 MCG/ACT nasal spray   ibuprofen (ADVIL,MOTRIN) 200 MG tablet   metFORMIN (GLUCOPHAGE) 500 MG tablet   nabumetone (RELAFEN) 750 MG tablet   order for DME   PSEUDOEPHEDRINE HCL 60 MG OR TABS   simvastatin (ZOCOR) 40 MG tablet         Review of Systems   Constitutional: Positive for chills, fatigue and fever.   HENT: Positive for ear pain, mouth sores, sinus pressure and sinus pain.    Eyes: Negative.    Respiratory: Positive for cough and shortness of breath.    Cardiovascular: Negative.    Gastrointestinal: Negative.    Endocrine: Negative.    Genitourinary: Negative.   "  Musculoskeletal: Negative.    Skin: Positive for rash.        Right forearm    Allergic/Immunologic: Negative.    Neurological: Positive for headaches.   Hematological: Negative.    Psychiatric/Behavioral: Negative.        Physical Exam   BP: 167/84  Pulse: 84  Temp: 96.2  F (35.7  C)  Resp: 20  Height: 180.3 cm (5' 11\")  Weight: 95.3 kg (210 lb)  SpO2: 94 %      Physical Exam   HENT:   Right Ear: External ear normal.   Nose: Right sinus exhibits maxillary sinus tenderness. Left sinus exhibits maxillary sinus tenderness.   Mouth/Throat: Uvula is midline, oropharynx is clear and moist and mucous membranes are normal.   Cardiovascular: Normal rate and regular rhythm.   Pulmonary/Chest: Effort normal and breath sounds normal. No respiratory distress.   Abdominal: Soft. Bowel sounds are normal. There is no tenderness.       ED Course        Procedures              Critical Care time:  none             No results found for this or any previous visit (from the past 24 hour(s)).    Medications - No data to display    Assessments & Plan (with Medical Decision Making)     I have reviewed the nursing notes.    I have reviewed the findings, diagnosis, plan and need for follow up with the patient.  Patient verbally educated and given appropriate education sheets for each of the diagnoses and has no questions.  Take OTC motrin or tylenol as directed on the bottle as needed.  Take prescription medications as directed.  Increase fluids, wash hands often.  Sleep in a recliner or with multiple pillows until this has resolved.  Follow up with your provider if symptoms increase or if further concerns develop, return to the ER.             Medication List      Started    amoxicillin-clavulanate 875-125 MG tablet  Commonly known as:  AUGMENTIN  1 tablet, Oral, 2 TIMES DAILY     fluticasone 50 MCG/ACT nasal spray  Commonly known as:  FLONASE  1 spray, Both Nostrils, DAILY            Final diagnoses:   Acute maxillary sinusitis, " recurrence not specified       1/3/2019   [facility]     BeverlyTracie skaggs, APRN CNP  01/03/19 1145

## 2019-01-03 NOTE — ED AVS SNAPSHOT
[facility]  [address] [place] [id number]  Phone:  [phone]                                    Efrain Weiner   MRN: [id number]    Department:  [facility]   Date of Visit:  1/3/2019           After Visit Summary Signature Page    I have received my discharge instructions, and my questions have been answered. I have discussed any challenges I see with this plan with the nurse or doctor.    ..........................................................................................................................................  Patient/Patient Representative Signature      ..........................................................................................................................................  Patient Representative Print Name and Relationship to Patient    ..................................................               ................................................  Date                                   Time    ..........................................................................................................................................  Reviewed by Signature/Title    ...................................................              ..............................................  Date                                               Time          22EPIC Rev 08/18

## 2019-04-05 ENCOUNTER — APPOINTMENT (OUTPATIENT)
Dept: CT IMAGING | Facility: HOSPITAL | Age: 79
End: 2019-04-05
Attending: FAMILY MEDICINE
Payer: MEDICARE

## 2019-04-05 ENCOUNTER — HOSPITAL ENCOUNTER (EMERGENCY)
Facility: HOSPITAL | Age: 79
Discharge: HOME | End: 2019-04-05
Attending: FAMILY MEDICINE | Admitting: FAMILY MEDICINE
Payer: MEDICARE

## 2019-04-05 VITALS
HEART RATE: 49 BPM | SYSTOLIC BLOOD PRESSURE: 153 MMHG | OXYGEN SATURATION: 96 % | RESPIRATION RATE: 16 BRPM | DIASTOLIC BLOOD PRESSURE: 89 MMHG | TEMPERATURE: 97.9 F

## 2019-04-05 DIAGNOSIS — N21.0 URINARY BLADDER CALCULUS: ICD-10-CM

## 2019-04-05 LAB
ANION GAP SERPL CALCULATED.3IONS-SCNC: 5 MMOL/L (ref 3–14)
BASOPHILS # BLD AUTO: 0.1 10E9/L (ref 0–0.2)
BASOPHILS NFR BLD AUTO: 0.9 %
BUN SERPL-MCNC: 21 MG/DL (ref 7–30)
CALCIUM SERPL-MCNC: 8.7 MG/DL (ref 8.5–10.1)
CHLORIDE SERPL-SCNC: 108 MMOL/L (ref 94–109)
CO2 SERPL-SCNC: 27 MMOL/L (ref 20–32)
CREAT SERPL-MCNC: 1.25 MG/DL (ref 0.66–1.25)
DIFFERENTIAL METHOD BLD: NORMAL
EOSINOPHIL # BLD AUTO: 0.1 10E9/L (ref 0–0.7)
EOSINOPHIL NFR BLD AUTO: 1.8 %
ERYTHROCYTE [DISTWIDTH] IN BLOOD BY AUTOMATED COUNT: 12.8 % (ref 10–15)
GFR SERPL CREATININE-BSD FRML MDRD: 55 ML/MIN/{1.73_M2}
GLUCOSE SERPL-MCNC: 186 MG/DL (ref 70–99)
HCT VFR BLD AUTO: 45.4 % (ref 40–53)
HGB BLD-MCNC: 15.2 G/DL (ref 13.3–17.7)
IMM GRANULOCYTES # BLD: 0 10E9/L (ref 0–0.4)
IMM GRANULOCYTES NFR BLD: 0.4 %
LYMPHOCYTES # BLD AUTO: 1.3 10E9/L (ref 0.8–5.3)
LYMPHOCYTES NFR BLD AUTO: 23.2 %
MCH RBC QN AUTO: 30.9 PG (ref 26.5–33)
MCHC RBC AUTO-ENTMCNC: 33.5 G/DL (ref 31.5–36.5)
MCV RBC AUTO: 92 FL (ref 78–100)
MONOCYTES # BLD AUTO: 0.4 10E9/L (ref 0–1.3)
MONOCYTES NFR BLD AUTO: 6.2 %
NEUTROPHILS # BLD AUTO: 3.8 10E9/L (ref 1.6–8.3)
NEUTROPHILS NFR BLD AUTO: 67.5 %
NRBC # BLD AUTO: 0 10*3/UL
NRBC BLD AUTO-RTO: 0 /100
PLATELET # BLD AUTO: 233 10E9/L (ref 150–450)
POTASSIUM SERPL-SCNC: 4.4 MMOL/L (ref 3.4–5.3)
RBC # BLD AUTO: 4.92 10E12/L (ref 4.4–5.9)
SODIUM SERPL-SCNC: 140 MMOL/L (ref 133–144)
WBC # BLD AUTO: 5.7 10E9/L (ref 4–11)

## 2019-04-05 PROCEDURE — 36415 COLL VENOUS BLD VENIPUNCTURE: CPT | Performed by: FAMILY MEDICINE

## 2019-04-05 PROCEDURE — 80048 BASIC METABOLIC PNL TOTAL CA: CPT | Performed by: FAMILY MEDICINE

## 2019-04-05 PROCEDURE — 96360 HYDRATION IV INFUSION INIT: CPT

## 2019-04-05 PROCEDURE — 99284 EMERGENCY DEPT VISIT MOD MDM: CPT | Mod: Z6 | Performed by: FAMILY MEDICINE

## 2019-04-05 PROCEDURE — 99284 EMERGENCY DEPT VISIT MOD MDM: CPT | Mod: 25

## 2019-04-05 PROCEDURE — 74176 CT ABD & PELVIS W/O CONTRAST: CPT | Mod: TC

## 2019-04-05 PROCEDURE — 85025 COMPLETE CBC W/AUTO DIFF WBC: CPT | Performed by: FAMILY MEDICINE

## 2019-04-05 PROCEDURE — 25000128 H RX IP 250 OP 636: Performed by: FAMILY MEDICINE

## 2019-04-05 RX ORDER — ONDANSETRON 2 MG/ML
4 INJECTION INTRAMUSCULAR; INTRAVENOUS ONCE
Status: DISCONTINUED | OUTPATIENT
Start: 2019-04-05 | End: 2019-04-05 | Stop reason: HOSPADM

## 2019-04-05 RX ORDER — KETOROLAC TROMETHAMINE 15 MG/ML
15 INJECTION, SOLUTION INTRAMUSCULAR; INTRAVENOUS ONCE
Status: DISCONTINUED | OUTPATIENT
Start: 2019-04-05 | End: 2019-04-05 | Stop reason: HOSPADM

## 2019-04-05 RX ORDER — SODIUM CHLORIDE 9 MG/ML
1000 INJECTION, SOLUTION INTRAVENOUS CONTINUOUS
Status: DISCONTINUED | OUTPATIENT
Start: 2019-04-05 | End: 2019-04-05 | Stop reason: HOSPADM

## 2019-04-05 RX ADMIN — SODIUM CHLORIDE 1000 ML: 9 INJECTION, SOLUTION INTRAVENOUS at 08:52

## 2019-04-05 SDOH — HEALTH STABILITY: MENTAL HEALTH: HOW OFTEN DO YOU HAVE A DRINK CONTAINING ALCOHOL?: NEVER

## 2019-04-05 ASSESSMENT — ENCOUNTER SYMPTOMS
SHORTNESS OF BREATH: 0
DIAPHORESIS: 0
NAUSEA: 1
DYSURIA: 0
ACTIVITY CHANGE: 1
DIARRHEA: 0
VOMITING: 1
MUSCULOSKELETAL NEGATIVE: 1
FATIGUE: 0
PSYCHIATRIC NEGATIVE: 1
ABDOMINAL PAIN: 0
NEUROLOGICAL NEGATIVE: 1
CONSTIPATION: 0

## 2019-04-05 NOTE — ED AVS SNAPSHOT
[facility]  [address] [place] [id number]  Phone:  [phone]                                    Efrain Weiner   MRN: [id number]    Department:  [facility]   Date of Visit:  4/5/2019           After Visit Summary Signature Page    I have received my discharge instructions, and my questions have been answered. I have discussed any challenges I see with this plan with the nurse or doctor.    ..........................................................................................................................................  Patient/Patient Representative Signature      ..........................................................................................................................................  Patient Representative Print Name and Relationship to Patient    ..................................................               ................................................  Date                                   Time    ..........................................................................................................................................  Reviewed by Signature/Title    ...................................................              ..............................................  Date                                               Time          22EPIC Rev 08/18

## 2019-04-05 NOTE — ED NOTES
Presents to ER with c/o left sided flank pain; started at approx 0500 this am, accompanied with brief episode of nausea. Does have history of kidney stones and feels like he's having another stone, Accompanied by wife/ See assessments. Call light placed within reach.

## 2019-04-05 NOTE — ED NOTES
Reviewed discharge instructions with pt and spouse, no questions offered, verbalized understanding. Copy of AVS provided prior to leaving.

## 2019-04-05 NOTE — ED PROVIDER NOTES
"  History     Chief Complaint   Patient presents with     Flank Pain     \"I'm having a kidney stone\", left sided flank pain, hx of kidney stones     HPI  Efrain Weiner is a 79 year old male who presents to the emergency room with pain in his left flank that started this morning about 5:00.  He has had multiple kidney stones in the past, knows that they do get to size with a cannot be passed as he had a 1 cm stone previously.  At this time his pain is minimal, he has had some nausea and vomiting at home but none now.  He is concerned only that the stone might be too big to go through.  He is aware he has multiple stones in his kidneys themselves.    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     Priority: Medium     S/P total knee replacement, right 06/07/2016     Priority: Medium     S/P hip replacement, right 06/07/2016     Priority: Medium     H/O nephrolithiasis 06/01/2016     Priority: Medium     Dyslipidemia      Priority: Medium     TERESO (obstructive sleep apnea)      Priority: Medium     Prostate cancer (H) 04/26/2013     Priority: Medium     08/2011  S/P DaVinci prostatectomy       Osteoarthrosis, multiple sites 03/25/2011     Priority: Medium     ED (erectile dysfunction) 03/25/2011     Priority: Medium     BPH (benign prostatic hyerplasia) 04/08/2005     Priority: Medium     Type 2 diabetes mellitus without complication (H) 09/29/2004     Priority: Medium        Past Medical History:    Past Medical History:   Diagnosis Date     Allergic rhinitis, seasonal 3/25/2011     BPH 4/8/2005     Cancer, Prostate 3/25/2011     Chronic prostatitis 9/5/2007     Diabetes Mellitus, Type 2 9/29/2004     Dyslipidemia 3/25/2011     Eczema 3/25/2011     Erectile Dysfunction 3/25/2011     Mixed hyperlipidemia      Nephrolithiasis 8/24/2011     TERESO on CPAP      " Osteoarthritis, Generalized 3/25/2011       Past Surgical History:    Past Surgical History:   Procedure Laterality Date     BIOPSY PROSTATE TRANSRECTAL       cataract extraction      right     COLONOSCOPY      2006     COLONOSCOPY  2003     excision of acrochordon       excision of peritonsillar abscess       kidney stones blasted       laparoscopic bilateral inguinal hernia repair  2011    Bilateral inguinal hernia     prostatectomy  2011    Adenocarcinoma of the prostate     shoulder arthroscopy with Pam procedure      right     VASECTOMY         Family History:    Family History   Problem Relation Age of Onset     Prostate Cancer Father         cause of death     Diabetes Father      Hypertension Father      Other - See Comments Father         renal disease     Cerebrovascular Disease Mother         CVA     Diabetes Paternal Grandfather      Glaucoma Brother      Asthma No family hx of        Social History:  Marital Status:   [2]  Social History     Tobacco Use     Smoking status: Former Smoker     Types: Cigarettes     Last attempt to quit: 1969     Years since quittin.2     Smokeless tobacco: Never Used     Tobacco comment: no passive smoke exposure   Substance Use Topics     Alcohol use: Yes     Frequency: Never     Comment: socially     Drug use: No        Medications:      ACCU-CHEK MULTICLIX LANCETS MISC   blood glucose monitoring (ACCU-CHEK LIZETTE) test strip   Blood Glucose Monitoring Suppl (ACCU-CHEK LIZETTE PLUS) W/DEVICE KIT   BUFFERED ASPIRIN 325 MG OR TABS   Cholecalciferol (VITAMIN D) 2000 UNITS tablet   DiphenhydrAMINE HCl, Sleep, 25 MG CAPS   ibuprofen (ADVIL,MOTRIN) 200 MG tablet   metFORMIN (GLUCOPHAGE) 500 MG tablet   nabumetone (RELAFEN) 750 MG tablet   order for DME   PSEUDOEPHEDRINE HCL 60 MG OR TABS   simvastatin (ZOCOR) 40 MG tablet         Review of Systems   Constitutional: Positive for activity change. Negative for diaphoresis and fatigue.   HENT:  Negative.    Respiratory: Negative for shortness of breath.    Cardiovascular: Negative for chest pain.   Gastrointestinal: Positive for nausea and vomiting. Negative for abdominal pain, constipation and diarrhea.   Genitourinary: Negative for dysuria.   Musculoskeletal: Negative.    Neurological: Negative.    Psychiatric/Behavioral: Negative.        Physical Exam   BP: 173/91  Pulse: (!) 49  Heart Rate: 68  Temp: 96  F (35.6  C)  Resp: 16  SpO2: 98 %      Physical Exam   Constitutional: He is oriented to person, place, and time. He appears well-developed and well-nourished. No distress.   HENT:   Head: Normocephalic and atraumatic.   Neck: Normal range of motion. Neck supple.   Cardiovascular: Normal rate, regular rhythm and normal heart sounds.   No murmur heard.  Pulmonary/Chest: Effort normal and breath sounds normal. No respiratory distress.   Abdominal: Soft. Bowel sounds are normal. He exhibits no distension. There is no tenderness. There is no CVA tenderness.   Musculoskeletal: Normal range of motion.   Neurological: He is alert and oriented to person, place, and time.   Skin: Skin is warm and dry. Capillary refill takes less than 2 seconds.   Psychiatric: He has a normal mood and affect.       ED Course        Procedures    Results for orders placed or performed during the hospital encounter of 04/05/19 (from the past 24 hour(s))   CBC with platelets differential   Result Value Ref Range    WBC 5.7 4.0 - 11.0 10e9/L    RBC Count 4.92 4.4 - 5.9 10e12/L    Hemoglobin 15.2 13.3 - 17.7 g/dL    Hematocrit 45.4 40.0 - 53.0 %    MCV 92 78 - 100 fl    MCH 30.9 26.5 - 33.0 pg    MCHC 33.5 31.5 - 36.5 g/dL    RDW 12.8 10.0 - 15.0 %    Platelet Count 233 150 - 450 10e9/L    Diff Method Automated Method     % Neutrophils 67.5 %    % Lymphocytes 23.2 %    % Monocytes 6.2 %    % Eosinophils 1.8 %    % Basophils 0.9 %    % Immature Granulocytes 0.4 %    Nucleated RBCs 0 0 /100    Absolute Neutrophil 3.8 1.6 - 8.3 10e9/L     Absolute Lymphocytes 1.3 0.8 - 5.3 10e9/L    Absolute Monocytes 0.4 0.0 - 1.3 10e9/L    Absolute Eosinophils 0.1 0.0 - 0.7 10e9/L    Absolute Basophils 0.1 0.0 - 0.2 10e9/L    Abs Immature Granulocytes 0.0 0 - 0.4 10e9/L    Absolute Nucleated RBC 0.0    Basic metabolic panel   Result Value Ref Range    Sodium 140 133 - 144 mmol/L    Potassium 4.4 3.4 - 5.3 mmol/L    Chloride 108 94 - 109 mmol/L    Carbon Dioxide 27 20 - 32 mmol/L    Anion Gap 5 3 - 14 mmol/L    Glucose 186 (H) 70 - 99 mg/dL    Urea Nitrogen 21 7 - 30 mg/dL    Creatinine 1.25 0.66 - 1.25 mg/dL    GFR Estimate 55 (L) >60 mL/min/[1.73_m2]    GFR Estimate If Black 63 >60 mL/min/[1.73_m2]    Calcium 8.7 8.5 - 10.1 mg/dL   Abd/pelvis CT - no contrast - Stone Protocol    Narrative    EXAMINATION: CT ABDOMEN PELVIS W/O CONTRAST, 4/5/2019 9:04 AM    TECHNIQUE:  Helical CT images from the lung bases through the  symphysis pubis were obtained  without IV contrast. Contrast dose:    COMPARISON: 2016    HISTORY: Flank pain, recurrent stone disease suspected    FINDINGS:    There is dependent atelectasis at the lung bases.    The liver is free of masses or biliary ductal enlargement. calcified  gallstones are seen.    The the spleen and pancreas appear normal.    The adrenal glands are normal.    The right and left kidneys are free of masses or hydronephrosis.  Multiple bilateral renal calculi are noted. There is a small high  density cysts seen in the right kidney which is stable from 2016. No  ureteric calculi are seen.    The periaortic lymph nodes are normal in caliber.    No intraperitoneal masses or inflammatory changes are noted. The  appendix appears normal.    A stone is seen posteriorly in the bladder most likely a recently  passed ureteric calculus. Postoperative changes are seen in the  inguinal region on the right.    Severe degenerative changes are present in the thoracic and lumbar  spines. There is a hip prosthesis in place on the right.       Impression    IMPRESSION: Calculus seen posteriorly within the bladder most likely a  recently passed ureteric calculus. Multiple intrarenal calculi are  noted bilaterally     BETH PRASAD MD       Medications   0.9% sodium chloride BOLUS (0 mLs Intravenous Stopped 4/5/19 2302)     Followed by   sodium chloride 0.9% infusion (not administered)   ketorolac (TORADOL) injection 15 mg (not administered)   ondansetron (ZOFRAN) injection 4 mg (not administered)       Assessments & Plan (with Medical Decision Making)   Calculus is in the bladder posteriorly, most likely passed recently.  There is no residual calculi in the ureter, he does have multiple bilateral renal calculi.  Informed the patient of this, he will go home and rest expecting that the stone will pass without difficulty.  Follow-up with primary care as needed.    I have reviewed the nursing notes.    I have reviewed the findings, diagnosis, plan and need for follow up with the patient.     Medication List      There are no discharge medications for this visit.         Final diagnoses:   Urinary bladder calculus       4/5/2019   [facility]     Maura Siu MD  04/05/19 9063     none at this time

## 2019-04-05 NOTE — PROGRESS NOTES
Emergency Department Assessment  PCP: Rodolfo Cisneros  Specialists or  providers: urology  Pharmacy: [facility]   Medication routine/concerns no concerns, utilizes a pill box   Cognitive Behavioral Status: alert and oriented    Affect and Mood Status: pleasant      Case Manger/: not connected     Support System: wife, family   Transportation: self     Current Living Situation:    Home Setting: Two-story   Living Situation:Spouse   Function Status/Assistive Device: no assistive devices    Employment/Financial/Insurance Concerns:retired     Insurance Plan     Patient's insurance coverage: Medicare,[employer]      [facility]: n/a  Health Care Directive: no, not interested in information   Previous Services at Home or in the Community:not connected   Falls at home?: no     ADL's:independent   Equipment at home?: none   O2: no      Plan: home via wife, Charlene.  No questions or concerns identified

## 2019-04-05 NOTE — DISCHARGE INSTRUCTIONS
Stone has passed into the bladder, will expect that it will complete its path in the next few hours.  He does still have residual stones in your kidneys, however there are none in the ureter at this time.

## 2019-04-19 DIAGNOSIS — M19.90 OSTEOARTHRITIS, UNSPECIFIED OSTEOARTHRITIS TYPE, UNSPECIFIED SITE: ICD-10-CM

## 2019-04-22 NOTE — TELEPHONE ENCOUNTER
Nabumetone 750 mg      Last Written Prescription Date:  09/11/18  Last Fill Quantity: 180,   # refills: 1  Last Office Visit: 08/28/18  Future Office visit:

## 2019-05-21 DIAGNOSIS — E78.5 DYSLIPIDEMIA: ICD-10-CM

## 2019-05-21 NOTE — TELEPHONE ENCOUNTER
Simvastatin 40 mg      Last Written Prescription Date:  11/02/18  Last Fill Quantity: 45,   # refills: 0  Last Office Visit: 08/28/18  Future Office visit:

## 2019-05-22 RX ORDER — SIMVASTATIN 40 MG
TABLET ORAL
Qty: 45 TABLET | Refills: 0 | Status: SHIPPED | OUTPATIENT
Start: 2019-05-22 | End: 2020-08-20

## 2019-05-22 NOTE — TELEPHONE ENCOUNTER
Due for labs, ordered on 7/5/18.    LDL Cholesterol Calculated   Date Value Ref Range Status   05/23/2017 89 <100 mg/dL Final     Comment:     Desirable:       <100 mg/dl

## 2019-07-04 ENCOUNTER — HOSPITAL ENCOUNTER (EMERGENCY)
Facility: HOSPITAL | Age: 79
Discharge: HOME | End: 2019-07-04
Attending: PHYSICIAN ASSISTANT | Admitting: PHYSICIAN ASSISTANT
Payer: MEDICARE

## 2019-07-04 ENCOUNTER — APPOINTMENT (OUTPATIENT)
Dept: CT IMAGING | Facility: HOSPITAL | Age: 79
End: 2019-07-04
Attending: PHYSICIAN ASSISTANT
Payer: MEDICARE

## 2019-07-04 DIAGNOSIS — N20.0 KIDNEY STONE: ICD-10-CM

## 2019-07-04 LAB
ALBUMIN UR-MCNC: NEGATIVE MG/DL
APPEARANCE UR: CLEAR
BACTERIA #/AREA URNS HPF: ABNORMAL /HPF
BILIRUB UR QL STRIP: NEGATIVE
COLOR UR AUTO: ABNORMAL
GLUCOSE BLDC GLUCOMTR-MCNC: 156 MG/DL (ref 70–99)
GLUCOSE UR STRIP-MCNC: >1000 MG/DL
HGB UR QL STRIP: ABNORMAL
KETONES UR STRIP-MCNC: NEGATIVE MG/DL
LEUKOCYTE ESTERASE UR QL STRIP: NEGATIVE
NITRATE UR QL: NEGATIVE
PH UR STRIP: 5.5 PH (ref 4.7–8)
RBC #/AREA URNS AUTO: 2 /HPF (ref 0–2)
SOURCE: ABNORMAL
SP GR UR STRIP: 1.02 (ref 1–1.03)
UROBILINOGEN UR STRIP-MCNC: NORMAL MG/DL (ref 0–2)
WBC #/AREA URNS AUTO: 1 /HPF (ref 0–5)

## 2019-07-04 PROCEDURE — 00000146 ZZHCL STATISTIC GLUCOSE BY METER IP

## 2019-07-04 PROCEDURE — 81001 URINALYSIS AUTO W/SCOPE: CPT | Performed by: PHYSICIAN ASSISTANT

## 2019-07-04 PROCEDURE — 99213 OFFICE O/P EST LOW 20 MIN: CPT | Mod: Z6 | Performed by: PHYSICIAN ASSISTANT

## 2019-07-04 PROCEDURE — 25000131 ZZH RX MED GY IP 250 OP 636 PS 637: Performed by: PHYSICIAN ASSISTANT

## 2019-07-04 PROCEDURE — 25000132 ZZH RX MED GY IP 250 OP 250 PS 637: Mod: GY | Performed by: PHYSICIAN ASSISTANT

## 2019-07-04 PROCEDURE — 74176 CT ABD & PELVIS W/O CONTRAST: CPT | Mod: TC

## 2019-07-04 PROCEDURE — G0463 HOSPITAL OUTPT CLINIC VISIT: HCPCS | Mod: 25

## 2019-07-04 RX ORDER — TAMSULOSIN HYDROCHLORIDE 0.4 MG/1
0.4 CAPSULE ORAL DAILY
Qty: 60 CAPSULE | Refills: 0 | Status: SHIPPED | OUTPATIENT
Start: 2019-07-04 | End: 2020-09-18

## 2019-07-04 RX ORDER — OXYCODONE HYDROCHLORIDE 5 MG/1
5 TABLET ORAL ONCE
Status: COMPLETED | OUTPATIENT
Start: 2019-07-04 | End: 2019-07-04

## 2019-07-04 RX ORDER — OXYCODONE HYDROCHLORIDE 5 MG/1
5 TABLET ORAL EVERY 6 HOURS PRN
Qty: 12 TABLET | Refills: 0 | Status: SHIPPED | OUTPATIENT
Start: 2019-07-04 | End: 2020-09-18

## 2019-07-04 RX ORDER — ONDANSETRON 4 MG/1
4 TABLET, ORALLY DISINTEGRATING ORAL ONCE
Status: COMPLETED | OUTPATIENT
Start: 2019-07-04 | End: 2019-07-04

## 2019-07-04 RX ADMIN — ONDANSETRON 4 MG: 4 TABLET, ORALLY DISINTEGRATING ORAL at 12:47

## 2019-07-04 RX ADMIN — OXYCODONE HYDROCHLORIDE 5 MG: 5 TABLET ORAL at 12:47

## 2019-07-04 ASSESSMENT — ENCOUNTER SYMPTOMS
HEMATURIA: 0
SHORTNESS OF BREATH: 0
FREQUENCY: 0
FLANK PAIN: 1
FEVER: 0
NAUSEA: 1
CHILLS: 0
DIFFICULTY URINATING: 0
ABDOMINAL PAIN: 1
DYSURIA: 0

## 2019-07-04 NOTE — DISCHARGE INSTRUCTIONS
5x6mm stone in the upper portion of the left ureter (urine tube leading to bladder).  Push fluids, take Flomax daily, strain urine to catch stone.  You should bring this in for analysis if you have not yet done this with your primary provider to determine the cause for your kidney stones.  You should take Tylenol 1000 mg 3 times daily and ibuprofen 600 mg 3 times daily with food for pain.  I have given you a small supply of narcotic medication for breakthrough pain if needed.      If you have not passed your stone in the next couple of days, or begin to have increased pain, nausea, fever/chills/sweats, please return to urgent care or the emergency department.

## 2019-07-04 NOTE — ED TRIAGE NOTES
"Pt presents today with c/o possible kidney stone. Pt is having flank pain and right abdominal pain. Started at 7 am this morning. Pt is still able to urinate. PT states pain went away for \"a while\" but came back worse and now higher up. Pt has been drinking water since the start of pain. HX of kidney stones.    "

## 2019-07-04 NOTE — ED PROVIDER NOTES
History     Chief Complaint   Patient presents with     Flank Pain     lt flank pain, notes hx of kidney stones     HPI  Efrain Weiner is a 79 year old male who presents to urgent care today with complaints of left flank pain.  Set of symptoms this morning at 7 AM.  Patient states he awoke this morning to sharp left lower flank pain, which worsened for a short period of time and seemed to improve.  However, after a brief period, he notes that his pain has now significantly worsened.  Associated nausea.  Denies any fever, chills, sweats, urinary urgency, frequency, hematuria, passed stones or sediment, changes in urinary stream, bowel changes, recent illness, vomiting or ill contacts.  He has not taken anything for pain.  He does have a history of nephrolithiasis and lithotripsy,  with the most recent procedure 3 months ago, when he had a 9 mm stone removed.  Last CT showed multiple infrarenal stones present.      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     Priority: Medium     S/P total knee replacement, right 06/07/2016     Priority: Medium     S/P hip replacement, right 06/07/2016     Priority: Medium     H/O nephrolithiasis 06/01/2016     Priority: Medium     Dyslipidemia      Priority: Medium     TERESO (obstructive sleep apnea)      Priority: Medium     Prostate cancer (H) 04/26/2013     Priority: Medium     08/2011  S/P DaVinci prostatectomy       Osteoarthrosis, multiple sites 03/25/2011     Priority: Medium     ED (erectile dysfunction) 03/25/2011     Priority: Medium     BPH (benign prostatic hyerplasia) 04/08/2005     Priority: Medium     Type 2 diabetes mellitus without complication (H) 09/29/2004     Priority: Medium        Past Medical History:    Past Medical History:   Diagnosis Date     Allergic rhinitis, seasonal 3/25/2011     BPH 4/8/2005      Cancer, Prostate 3/25/2011     Chronic prostatitis 2007     Diabetes Mellitus, Type 2 2004     Dyslipidemia 3/25/2011     Eczema 3/25/2011     Erectile Dysfunction 3/25/2011     Mixed hyperlipidemia      Nephrolithiasis 2011     TERESO on CPAP      Osteoarthritis, Generalized 3/25/2011       Past Surgical History:    Past Surgical History:   Procedure Laterality Date     BIOPSY PROSTATE TRANSRECTAL       cataract extraction      right     COLONOSCOPY           COLONOSCOPY  2003     excision of acrochordon       excision of peritonsillar abscess       kidney stones blasted       laparoscopic bilateral inguinal hernia repair  2011    Bilateral inguinal hernia     prostatectomy  2011    Adenocarcinoma of the prostate     shoulder arthroscopy with Pam procedure      right     VASECTOMY         Family History:    Family History   Problem Relation Age of Onset     Prostate Cancer Father         cause of death     Diabetes Father      Hypertension Father      Other - See Comments Father         renal disease     Cerebrovascular Disease Mother         CVA     Diabetes Paternal Grandfather      Glaucoma Brother      Asthma No family hx of        Social History:  Marital Status:   [2]  Social History     Tobacco Use     Smoking status: Former Smoker     Types: Cigarettes     Last attempt to quit: 1969     Years since quittin.4     Smokeless tobacco: Never Used     Tobacco comment: no passive smoke exposure   Substance Use Topics     Alcohol use: Yes     Frequency: Never     Comment: socially     Drug use: No        Medications:      BUFFERED ASPIRIN 325 MG OR TABS   Cholecalciferol (VITAMIN D) 2000 UNITS tablet   DiphenhydrAMINE HCl, Sleep, 25 MG CAPS   ibuprofen (ADVIL,MOTRIN) 200 MG tablet   metFORMIN (GLUCOPHAGE) 500 MG tablet   nabumetone (RELAFEN) 750 MG tablet   order for DME   PSEUDOEPHEDRINE HCL 60 MG OR TABS   simvastatin (ZOCOR) 40 MG tablet   ACCU-CHEK MULTICLIX  LANCETS MISC   blood glucose monitoring (ACCU-CHEK LIZETTE) test strip   Blood Glucose Monitoring Suppl (ACCU-CHEK LIZETTE PLUS) W/DEVICE KIT         Review of Systems   Constitutional: Negative for chills and fever.   Respiratory: Negative for shortness of breath.    Cardiovascular: Negative for chest pain.   Gastrointestinal: Positive for abdominal pain and nausea.   Genitourinary: Positive for flank pain. Negative for decreased urine volume, difficulty urinating, discharge, dysuria, frequency, hematuria and urgency.       Physical Exam          Physical Exam   Constitutional: He is oriented to person, place, and time. He appears well-developed and well-nourished. No distress.   Patient appears uncomfortable.   HENT:   Head: Normocephalic and atraumatic.   Eyes: Conjunctivae and EOM are normal.   Cardiovascular: Normal rate, regular rhythm and normal heart sounds.   No murmur heard.  Pulmonary/Chest: Effort normal and breath sounds normal. He has no wheezes. He has no rales.   Abdominal: Soft. Bowel sounds are normal. There is no tenderness. There is no guarding.   Left CVA tenderness.   Neurological: He is alert and oriented to person, place, and time.   Skin: Skin is warm and dry.   Psychiatric: He has a normal mood and affect. His behavior is normal.       ED Course        Procedures              Critical Care time:               Results for orders placed or performed during the hospital encounter of 07/04/19 (from the past 24 hour(s))   UA reflex to Microscopic and Culture   Result Value Ref Range    Color Urine Light Yellow     Appearance Urine Clear     Glucose Urine >1000 (A) NEG^Negative mg/dL    Bilirubin Urine Negative NEG^Negative    Ketones Urine Negative NEG^Negative mg/dL    Specific Gravity Urine 1.017 1.003 - 1.035    Blood Urine Small (A) NEG^Negative    pH Urine 5.5 4.7 - 8.0 pH    Protein Albumin Urine Negative NEG^Negative mg/dL    Urobilinogen mg/dL Normal 0.0 - 2.0 mg/dL    Nitrite Urine Negative  NEG^Negative    Leukocyte Esterase Urine Negative NEG^Negative    Source Midstream Urine     RBC Urine 2 0 - 2 /HPF    WBC Urine 1 0 - 5 /HPF    Bacteria Urine None (A) NEG^Negative /HPF   Glucose by meter   Result Value Ref Range    Glucose 156 (H) 70 - 99 mg/dL   CT Abdomen Pelvis w/o Contrast    Narrative    Exam:CT ABDOMEN PELVIS W/O CONTRAST    History:  79 years Male with history of flank pain and  nephrolithiasis. Patient has history of prostate cancer    4/5/2019    Technique: Axial CT imaging of the abdomen and pelvis was performed  without contrast. Coronal and sagittal reconstructions were obtained      Findings:      Lung bases:The lung bases are clear.        Kidneys: There is left-sided hydronephrosis and perinephric edema.  There is left hydroureter. There is a proximal left ureteral calculus  measuring 5 x 6 mm in diameter at the level of L4.    Bilateral renal calculi are again seen. There is no right-sided  hydronephrosis. There is a hemorrhagic cyst at the inferior pole the  right kidney unchanged from the prior study.       Abdomen: Evaluation is limited due to lack of intravenous contrast.  Unenhanced images of the liver spleen pancreas and adrenal glands are  unremarkable. There is choledocholithiasis without evidence of biliary  dilatation.  No abnormally distended or thickened loops of bowel are present.  The appendix is unremarkable.       Pelvis:There is no mass or lymphadenopathy. No abnormal fluid  collections are present.          Patient is post right total hip arthroplasty.      Impression    Impression: Proximal left ureteral calculus measuring 5 x 6 mm in  diameter. There is associated left-sided hydronephrosis hydroureter  and perinephric edema.    BRAXTON MASON MD       Medications   oxyCODONE (ROXICODONE) tablet 5 mg (5 mg Oral Given 7/4/19 1247)   ondansetron (ZOFRAN-ODT) ODT tab 4 mg (4 mg Oral Given 7/4/19 1247)       Assessments & Plan (with Medical Decision Making)    79-year-old male who presented to urgent care today with complaints of left flank pain that started at 7 AM this morning.  Physical examination revealed positive left CVA tenderness without any other significant findings.  Urinalysis revealed small amount of blood and elevated glucose, recheck point-of-care glucose 156.  CT scan of the abdomen and pelvis without contrast revealed a 5 x 6 mm stone in the proximal left ureter with associated hydronephrosis and perinephric edema.  Results were discussed with patient and his wife in detail.  Due to the size of the stone being on the edge of normal limits, patient would like to trial passing the stone on his own at home.  He is afebrile and his pain is now well controlled.  Plan to start tamsulosin 0.4 mg daily, advised to take 30 minutes following the same meal daily.  We discussed pain control with Tylenol 1000 mg 3 times daily, ibuprofen 600 mg 3 times daily with food, and oxycodone 5 mg every 6 hours as needed for breakthrough pain only.   was checked and no records were found for the past year.  Patient was advised to increase fluids, and was given supplies for straining his urine.  He will follow-up with his primary provider in 1 week if he has not passed a stone.  Discussed signs and symptoms of worsening, medication side effects, and when to return.  Patient verbalized understanding and is in agreement with plan.    I have reviewed the nursing notes.    I have reviewed the findings, diagnosis, plan and need for follow up with the patient.         Medication List      Started    oxyCODONE 5 MG tablet  Commonly known as:  ROXICODONE  5 mg, Oral, EVERY 6 HOURS PRN     tamsulosin 0.4 MG capsule  Commonly known as:  FLOMAX  0.4 mg, Oral, DAILY, Take 30min following the same meal            Final diagnoses:   Kidney stone       7/4/2019   [facility]     Deanne Landry PA-C  07/04/19 [id number]

## 2019-07-04 NOTE — ED AVS SNAPSHOT
[facility]  [address] [place] [id number]  Phone:  [phone]                                    Efrain Weiner   MRN: [id number]    Department:  [facility]   Date of Visit:  7/4/2019           After Visit Summary Signature Page    I have received my discharge instructions, and my questions have been answered. I have discussed any challenges I see with this plan with the nurse or doctor.    ..........................................................................................................................................  Patient/Patient Representative Signature      ..........................................................................................................................................  Patient Representative Print Name and Relationship to Patient    ..................................................               ................................................  Date                                   Time    ..........................................................................................................................................  Reviewed by Signature/Title    ...................................................              ..............................................  Date                                               Time          22EPIC Rev 08/18

## 2020-04-01 ENCOUNTER — TRANSFERRED RECORDS (OUTPATIENT)
Dept: HEALTH INFORMATION MANAGEMENT | Facility: CLINIC | Age: 80
End: 2020-04-01

## 2020-04-01 LAB — RETINOPATHY: NORMAL

## 2020-08-17 DIAGNOSIS — E78.5 DYSLIPIDEMIA: ICD-10-CM

## 2020-08-17 DIAGNOSIS — M19.90 OSTEOARTHRITIS, UNSPECIFIED OSTEOARTHRITIS TYPE, UNSPECIFIED SITE: ICD-10-CM

## 2020-08-18 NOTE — TELEPHONE ENCOUNTER
simvastatin      Last Written Prescription Date:  05/22/2019  Last Fill Quantity: 45,   # refills: 0  Last Office Visit: 08/28/2018  Future Office visit:       nabumetone      Last Written Prescription Date:  04/23/2019  Last Fill Quantity: 180,   # refills: 1

## 2020-08-20 RX ORDER — SIMVASTATIN 40 MG
TABLET ORAL
Qty: 45 TABLET | Refills: 0 | Status: SHIPPED | OUTPATIENT
Start: 2020-08-20 | End: 2022-02-22

## 2020-08-26 NOTE — TELEPHONE ENCOUNTER
Scheduled patient for med and DM f/u; he would like to complete fasting labs prior. Skye can you put those in for the PT? Thank you.

## 2020-09-17 ENCOUNTER — APPOINTMENT (OUTPATIENT)
Dept: LAB | Facility: OTHER | Age: 80
End: 2020-09-17
Attending: FAMILY MEDICINE
Payer: MEDICARE

## 2020-09-17 DIAGNOSIS — E78.5 DYSLIPIDEMIA: ICD-10-CM

## 2020-09-17 DIAGNOSIS — Z12.5 SCREENING FOR PROSTATE CANCER: ICD-10-CM

## 2020-09-17 DIAGNOSIS — E11.9 CONTROLLED TYPE 2 DIABETES MELLITUS WITHOUT COMPLICATION, WITHOUT LONG-TERM CURRENT USE OF INSULIN (H): Primary | ICD-10-CM

## 2020-09-17 LAB
ALBUMIN SERPL-MCNC: 2.9 G/DL (ref 3.4–5)
ALBUMIN UR-MCNC: NEGATIVE MG/DL
ALP SERPL-CCNC: 106 U/L (ref 40–150)
ALT SERPL W P-5'-P-CCNC: 21 U/L (ref 0–70)
ANION GAP SERPL CALCULATED.3IONS-SCNC: 6 MMOL/L (ref 3–14)
APPEARANCE UR: CLEAR
AST SERPL W P-5'-P-CCNC: 15 U/L (ref 0–45)
BACTERIA #/AREA URNS HPF: ABNORMAL /HPF
BASOPHILS # BLD AUTO: 0.1 10E9/L (ref 0–0.2)
BASOPHILS NFR BLD AUTO: 0.9 %
BILIRUB SERPL-MCNC: 1 MG/DL (ref 0.2–1.3)
BILIRUB UR QL STRIP: NEGATIVE
BUN SERPL-MCNC: 19 MG/DL (ref 7–30)
CALCIUM SERPL-MCNC: 8.5 MG/DL (ref 8.5–10.1)
CHLORIDE SERPL-SCNC: 108 MMOL/L (ref 94–109)
CHOLEST SERPL-MCNC: 170 MG/DL
CO2 SERPL-SCNC: 24 MMOL/L (ref 20–32)
COLOR UR AUTO: ABNORMAL
CREAT SERPL-MCNC: 1.08 MG/DL (ref 0.66–1.25)
DIFFERENTIAL METHOD BLD: NORMAL
EOSINOPHIL # BLD AUTO: 0.2 10E9/L (ref 0–0.7)
EOSINOPHIL NFR BLD AUTO: 2.9 %
ERYTHROCYTE [DISTWIDTH] IN BLOOD BY AUTOMATED COUNT: 12.5 % (ref 10–15)
EST. AVERAGE GLUCOSE BLD GHB EST-MCNC: 160 MG/DL
GFR SERPL CREATININE-BSD FRML MDRD: 65 ML/MIN/{1.73_M2}
GLUCOSE SERPL-MCNC: 131 MG/DL (ref 70–99)
GLUCOSE UR STRIP-MCNC: NEGATIVE MG/DL
HBA1C MFR BLD: 7.2 % (ref 0–5.6)
HCT VFR BLD AUTO: 51.7 % (ref 40–53)
HDLC SERPL-MCNC: 51 MG/DL
HGB BLD-MCNC: 17.6 G/DL (ref 13.3–17.7)
HGB UR QL STRIP: NEGATIVE
IMM GRANULOCYTES # BLD: 0 10E9/L (ref 0–0.4)
IMM GRANULOCYTES NFR BLD: 0.4 %
KETONES UR STRIP-MCNC: 5 MG/DL
LDLC SERPL CALC-MCNC: 107 MG/DL
LEUKOCYTE ESTERASE UR QL STRIP: NEGATIVE
LYMPHOCYTES # BLD AUTO: 2.5 10E9/L (ref 0.8–5.3)
LYMPHOCYTES NFR BLD AUTO: 45 %
MCH RBC QN AUTO: 31.9 PG (ref 26.5–33)
MCHC RBC AUTO-ENTMCNC: 34 G/DL (ref 31.5–36.5)
MCV RBC AUTO: 94 FL (ref 78–100)
MONOCYTES # BLD AUTO: 0.5 10E9/L (ref 0–1.3)
MONOCYTES NFR BLD AUTO: 8.8 %
MUCOUS THREADS #/AREA URNS LPF: PRESENT /LPF
NEUTROPHILS # BLD AUTO: 2.4 10E9/L (ref 1.6–8.3)
NEUTROPHILS NFR BLD AUTO: 42 %
NITRATE UR QL: NEGATIVE
NONHDLC SERPL-MCNC: 119 MG/DL
NRBC # BLD AUTO: 0 10*3/UL
NRBC BLD AUTO-RTO: 0 /100
PH UR STRIP: 5 PH (ref 4.7–8)
PLATELET # BLD AUTO: 233 10E9/L (ref 150–450)
POTASSIUM SERPL-SCNC: 4.3 MMOL/L (ref 3.4–5.3)
PROT SERPL-MCNC: 7.9 G/DL (ref 6.8–8.8)
PSA SERPL-ACNC: <0.01 UG/L (ref 0–4)
RBC # BLD AUTO: 5.51 10E12/L (ref 4.4–5.9)
RBC #/AREA URNS AUTO: 1 /HPF (ref 0–2)
SODIUM SERPL-SCNC: 138 MMOL/L (ref 133–144)
SOURCE: ABNORMAL
SP GR UR STRIP: 1.02 (ref 1–1.03)
TRIGL SERPL-MCNC: 60 MG/DL
TSH SERPL DL<=0.005 MIU/L-ACNC: 2.16 MU/L (ref 0.4–4)
UROBILINOGEN UR STRIP-MCNC: NORMAL MG/DL (ref 0–2)
WBC # BLD AUTO: 5.6 10E9/L (ref 4–11)
WBC #/AREA URNS AUTO: 1 /HPF (ref 0–5)

## 2020-09-17 PROCEDURE — 40000788 ZZHCL STATISTIC ESTIMATED AVERAGE GLUCOSE: Performed by: FAMILY MEDICINE

## 2020-09-17 PROCEDURE — 80061 LIPID PANEL: CPT | Mod: ZL | Performed by: FAMILY MEDICINE

## 2020-09-17 PROCEDURE — 84443 ASSAY THYROID STIM HORMONE: CPT | Mod: ZL | Performed by: FAMILY MEDICINE

## 2020-09-17 PROCEDURE — 85025 COMPLETE CBC W/AUTO DIFF WBC: CPT | Mod: ZL | Performed by: FAMILY MEDICINE

## 2020-09-17 PROCEDURE — G0103 PSA SCREENING: HCPCS | Mod: ZL | Performed by: FAMILY MEDICINE

## 2020-09-17 PROCEDURE — 81001 URINALYSIS AUTO W/SCOPE: CPT | Mod: ZL | Performed by: FAMILY MEDICINE

## 2020-09-17 PROCEDURE — 83036 HEMOGLOBIN GLYCOSYLATED A1C: CPT | Mod: ZL | Performed by: FAMILY MEDICINE

## 2020-09-17 PROCEDURE — 80053 COMPREHEN METABOLIC PANEL: CPT | Mod: ZL | Performed by: FAMILY MEDICINE

## 2020-09-17 PROCEDURE — 36415 COLL VENOUS BLD VENIPUNCTURE: CPT | Mod: ZL | Performed by: FAMILY MEDICINE

## 2020-09-18 ENCOUNTER — OFFICE VISIT (OUTPATIENT)
Dept: FAMILY MEDICINE | Facility: OTHER | Age: 80
End: 2020-09-18
Attending: FAMILY MEDICINE
Payer: COMMERCIAL

## 2020-09-18 VITALS
WEIGHT: 208 LBS | HEART RATE: 48 BPM | HEIGHT: 71 IN | DIASTOLIC BLOOD PRESSURE: 62 MMHG | RESPIRATION RATE: 18 BRPM | BODY MASS INDEX: 29.12 KG/M2 | OXYGEN SATURATION: 95 % | SYSTOLIC BLOOD PRESSURE: 130 MMHG | TEMPERATURE: 98 F

## 2020-09-18 DIAGNOSIS — C61 PROSTATE CANCER (H): ICD-10-CM

## 2020-09-18 DIAGNOSIS — Z23 NEED FOR PROPHYLACTIC VACCINATION AND INOCULATION AGAINST INFLUENZA: ICD-10-CM

## 2020-09-18 DIAGNOSIS — E11.9 TYPE 2 DIABETES MELLITUS WITHOUT COMPLICATION, WITHOUT LONG-TERM CURRENT USE OF INSULIN (H): Primary | ICD-10-CM

## 2020-09-18 DIAGNOSIS — E78.2 MIXED HYPERLIPIDEMIA: ICD-10-CM

## 2020-09-18 PROCEDURE — 90662 IIV NO PRSV INCREASED AG IM: CPT

## 2020-09-18 PROCEDURE — G0463 HOSPITAL OUTPT CLINIC VISIT: HCPCS | Mod: 25

## 2020-09-18 PROCEDURE — G0008 ADMIN INFLUENZA VIRUS VAC: HCPCS | Performed by: FAMILY MEDICINE

## 2020-09-18 PROCEDURE — G0009 ADMIN PNEUMOCOCCAL VACCINE: HCPCS | Performed by: FAMILY MEDICINE

## 2020-09-18 PROCEDURE — 99214 OFFICE O/P EST MOD 30 MIN: CPT | Performed by: FAMILY MEDICINE

## 2020-09-18 ASSESSMENT — MIFFLIN-ST. JEOR: SCORE: 1680.61

## 2020-09-18 ASSESSMENT — PAIN SCALES - GENERAL: PAINLEVEL: MILD PAIN (2)

## 2020-09-18 NOTE — PROGRESS NOTES
SUBJECTIVE:   Efrain Weiner is a 80 year old male who presents to clinic today for the following health issues:    Diabetes Follow-up      How often are you checking your blood sugar? rarely    What concerns do you have today about your diabetes? None     Do you have any of these symptoms? (Select all that apply)  No numbness or tingling in feet.  No redness, sores or blisters on feet.  No complaints of excessive thirst.  No reports of blurry vision.  No significant changes to weight.    Have you had a diabetic eye exam in the last 12 months? Yes-  Location: spring 2020 at [place]        BP Readings from Last 2 Encounters:   09/18/20 130/62   04/05/19 153/89     Hemoglobin A1C (%)   Date Value   09/17/2020 7.2 (H)   08/27/2018 7.1 (H)     LDL Cholesterol Calculated (mg/dL)   Date Value   09/17/2020 107 (H)   05/23/2017 89         Hyperlipidemia Follow-Up      Are you regularly taking any medication or supplement to lower your cholesterol?   Yes- simvastatin 40 mg daily    Are you having muscle aches or other side effects that you think could be caused by your cholesterol lowering medication?  No          How many servings of fruits and vegetables do you eat daily?  2-3    On average, how many sweetened beverages do you drink each day (Examples: soda, juice, sweet tea, etc.  Do NOT count diet or artificially sweetened beverages)?   0    How many days per week do you exercise enough to make your heart beat faster? 3 or less    How many minutes a day do you exercise enough to make your heart beat faster? 20 - 29    How many days per week do you miss taking your medication? 0      Prostate cancer      Duration: prior    Description (location/character/radiation): prostate cancer in remission    Intensity:  0/10    Accompanying signs and symptoms: none    History (similar episodes/previous evaluation): in remission    Precipitating or alleviating factors: None    Therapies tried and outcome: None        Problem list  and histories reviewed & adjusted, as indicated.  Additional history: as documented    Patient Active Problem List   Diagnosis     Prostate cancer (H)     Dyslipidemia     Type 2 diabetes mellitus without complication (H)     BPH (benign prostatic hyerplasia)     Osteoarthrosis, multiple sites     ED (erectile dysfunction)     TERESO (obstructive sleep apnea)     H/O nephrolithiasis     S/P total knee replacement, left     S/P total knee replacement, right     S/P hip replacement, right     Past Surgical History:   Procedure Laterality Date     BIOPSY PROSTATE TRANSRECTAL       cataract extraction      right     COLONOSCOPY           COLONOSCOPY  2003     excision of acrochordon       excision of peritonsillar abscess       kidney stones blasted       laparoscopic bilateral inguinal hernia repair  2011    Bilateral inguinal hernia     prostatectomy  2011    Adenocarcinoma of the prostate     shoulder arthroscopy with Pam procedure      right     VASECTOMY         Social History     Tobacco Use     Smoking status: Former Smoker     Types: Cigarettes     Last attempt to quit: 1969     Years since quittin.6     Smokeless tobacco: Never Used     Tobacco comment: no passive smoke exposure   Substance Use Topics     Alcohol use: Yes     Frequency: Never     Comment: socially     Family History   Problem Relation Age of Onset     Prostate Cancer Father         cause of death     Diabetes Father      Hypertension Father      Other - See Comments Father         renal disease     Cerebrovascular Disease Mother         CVA     Diabetes Paternal Grandfather      Glaucoma Brother      Asthma No family hx of          Current Outpatient Medications   Medication Sig Dispense Refill     ACCU-CHEK MULTICLIX LANCETS MISC Use to test blood sugar 1 time daily or as directed. 102 each 8     blood glucose monitoring (ACCU-CHEK LIZETTE) test strip Use to test blood sugars 1 time daily or as directed. 1 Box 3      Blood Glucose Monitoring Suppl (ACCU-CHEK LIZETTE PLUS) W/DEVICE KIT Use to test blood sugars 1 time daily or as directed. 1 kit 0     Cholecalciferol (VITAMIN D) 2000 UNITS tablet Take 2,000 Units by mouth daily 100 tablet 3     DiphenhydrAMINE HCl, Sleep, 25 MG CAPS Take 1 tablet by mouth bedtime       ibuprofen (ADVIL,MOTRIN) 200 MG tablet Take 1 tablet by mouth every 6 hours as needed. With food       nabumetone (RELAFEN) 750 MG tablet TAKE ONE (1) TABLET BY MOUTH TWICE DAILY WITH FOOD 180 tablet 1     order for DME Equipment being ordered: CPAP supplies 1 Device 0     PSEUDOEPHEDRINE HCL 60 MG OR TABS daily       simvastatin (ZOCOR) 40 MG tablet TAKE ONE (1) TABLET BY MOUTH EVERY OTHER DAY 45 tablet 0     Allergies   Allergen Reactions     Nkda [No Known Drug Allergies]      Recent Labs   Lab Test 09/17/20  0809 04/05/19  0853 08/27/18  0837 05/23/17  0953 06/01/16  0818   A1C 7.2*  --  7.1* 6.9* 6.9*   *  --   --  89 73   HDL 51  --   --  47 51   TRIG 60  --   --  94 84   ALT 21  --  24 16 17   CR 1.08 1.25 1.04 0.98 0.97   GFRESTIMATED 65 55* 69 75 75   GFRESTBLACK 75 63 84 >90   GFR Calc   >90   GFR Calc     POTASSIUM 4.3 4.4 4.2 4.4 4.2   TSH 2.16  --  1.70 1.46 1.35      BP Readings from Last 3 Encounters:   09/18/20 130/62   04/05/19 153/89   01/03/19 167/84    Wt Readings from Last 3 Encounters:   09/18/20 94.3 kg (208 lb)   01/03/19 95.3 kg (210 lb)   08/28/18 93 kg (205 lb)                    Reviewed and updated as needed this visit by clinical staff  Tobacco  Allergies  Meds  Problems       Reviewed and updated as needed this visit by Provider  Problems         ROS:  Constitutional, HEENT, cardiovascular, pulmonary, gi and gu systems are negative, except as otherwise noted.    OBJECTIVE:     /62 (BP Location: Right arm, Patient Position: Sitting, Cuff Size: Adult Regular)   Pulse (!) 48   Temp 98  F (36.7  C) (Tympanic)   Resp 18   Ht 1.803 m (5'  "11\")   Wt 94.3 kg (208 lb)   SpO2 95%   BMI 29.01 kg/m    Body mass index is 29.01 kg/m .  Physical Exam   Constitutional: He is oriented to person, place, and time. He appears well-developed and well-nourished. No distress.   Neurological: He is alert and oriented to person, place, and time.   Psychiatric: He has a normal mood and affect.       Other exam not repeated.  Orders Only on 09/17/2020   Component Date Value Ref Range Status     WBC 09/17/2020 5.6  4.0 - 11.0 10e9/L Final     RBC Count 09/17/2020 5.51  4.4 - 5.9 10e12/L Final     Hemoglobin 09/17/2020 17.6  13.3 - 17.7 g/dL Final     Hematocrit 09/17/2020 51.7  40.0 - 53.0 % Final     MCV 09/17/2020 94  78 - 100 fl Final     MCH 09/17/2020 31.9  26.5 - 33.0 pg Final     MCHC 09/17/2020 34.0  31.5 - 36.5 g/dL Final     RDW 09/17/2020 12.5  10.0 - 15.0 % Final     Platelet Count 09/17/2020 233  150 - 450 10e9/L Final     Diff Method 09/17/2020 Automated Method   Final     % Neutrophils 09/17/2020 42.0  % Final     % Lymphocytes 09/17/2020 45.0  % Final     % Monocytes 09/17/2020 8.8  % Final     % Eosinophils 09/17/2020 2.9  % Final     % Basophils 09/17/2020 0.9  % Final     % Immature Granulocytes 09/17/2020 0.4  % Final     Nucleated RBCs 09/17/2020 0  0 /100 Final     Absolute Neutrophil 09/17/2020 2.4  1.6 - 8.3 10e9/L Final     Absolute Lymphocytes 09/17/2020 2.5  0.8 - 5.3 10e9/L Final     Absolute Monocytes 09/17/2020 0.5  0.0 - 1.3 10e9/L Final     Absolute Eosinophils 09/17/2020 0.2  0.0 - 0.7 10e9/L Final     Absolute Basophils 09/17/2020 0.1  0.0 - 0.2 10e9/L Final     Abs Immature Granulocytes 09/17/2020 0.0  0 - 0.4 10e9/L Final     Absolute Nucleated RBC 09/17/2020 0.0   Final     Sodium 09/17/2020 138  133 - 144 mmol/L Final     Potassium 09/17/2020 4.3  3.4 - 5.3 mmol/L Final     Chloride 09/17/2020 108  94 - 109 mmol/L Final     Carbon Dioxide 09/17/2020 24  20 - 32 mmol/L Final     Anion Gap 09/17/2020 6  3 - 14 mmol/L Final     " Glucose 09/17/2020 131* 70 - 99 mg/dL Final     Urea Nitrogen 09/17/2020 19  7 - 30 mg/dL Final     Creatinine 09/17/2020 1.08  0.66 - 1.25 mg/dL Final     GFR Estimate 09/17/2020 65  >60 mL/min/[1.73_m2] Final    Comment: Non  GFR Calc  Starting 12/18/2018, serum creatinine based estimated GFR (eGFR) will be   calculated using the Chronic Kidney Disease Epidemiology Collaboration   (CKD-EPI) equation.       GFR Estimate If Black 09/17/2020 75  >60 mL/min/[1.73_m2] Final    Comment:  GFR Calc  Starting 12/18/2018, serum creatinine based estimated GFR (eGFR) will be   calculated using the Chronic Kidney Disease Epidemiology Collaboration   (CKD-EPI) equation.       Calcium 09/17/2020 8.5  8.5 - 10.1 mg/dL Final     Bilirubin Total 09/17/2020 1.0  0.2 - 1.3 mg/dL Final     Albumin 09/17/2020 2.9* 3.4 - 5.0 g/dL Final     Protein Total 09/17/2020 7.9  6.8 - 8.8 g/dL Final     Alkaline Phosphatase 09/17/2020 106  40 - 150 U/L Final     ALT 09/17/2020 21  0 - 70 U/L Final     AST 09/17/2020 15  0 - 45 U/L Final     Cholesterol 09/17/2020 170  <200 mg/dL Final     Triglycerides 09/17/2020 60  <150 mg/dL Final     HDL Cholesterol 09/17/2020 51  >39 mg/dL Final     LDL Cholesterol Calculated 09/17/2020 107* <100 mg/dL Final    Comment: Above desirable:  100-129 mg/dl  Borderline High:  130-159 mg/dL  High:             160-189 mg/dL  Very high:       >189 mg/dl       Non HDL Cholesterol 09/17/2020 119  <130 mg/dL Final     TSH 09/17/2020 2.16  0.40 - 4.00 mU/L Final     Hemoglobin A1C 09/17/2020 7.2* 0 - 5.6 % Final    Comment: Normal <5.7% Prediabetes 5.7-6.4%  Diabetes 6.5% or higher - adopted from ADA   consensus guidelines.       PSA 09/17/2020 <0.01  0 - 4 ug/L Final    Assay Method:  Chemiluminescence using Siemens Vista analyzer     Color Urine 09/17/2020 Light Yellow   Final     Appearance Urine 09/17/2020 Clear   Final     Glucose Urine 09/17/2020 Negative  NEG^Negative mg/dL Final      Bilirubin Urine 09/17/2020 Negative  NEG^Negative Final     Ketones Urine 09/17/2020 5* NEG^Negative mg/dL Final     Specific Gravity Urine 09/17/2020 1.017  1.003 - 1.035 Final     Blood Urine 09/17/2020 Negative  NEG^Negative Final     pH Urine 09/17/2020 5.0  4.7 - 8.0 pH Final     Protein Albumin Urine 09/17/2020 Negative  NEG^Negative mg/dL Final     Urobilinogen mg/dL 09/17/2020 Normal  0.0 - 2.0 mg/dL Final     Nitrite Urine 09/17/2020 Negative  NEG^Negative Final     Leukocyte Esterase Urine 09/17/2020 Negative  NEG^Negative Final     Source 09/17/2020 Midstream Urine   Final     WBC Urine 09/17/2020 1  0 - 5 /HPF Final     RBC Urine 09/17/2020 1  0 - 2 /HPF Final     Bacteria Urine 09/17/2020 None* NEG^Negative /HPF Final     Mucous Urine 09/17/2020 Present* NEG^Negative /LPF Final     Estimated Average Glucose 09/17/2020 160  mg/dL Final         ASSESSMENT/PLAN:     Type 2 diabetes mellitus without complication, without long-term current use of insulin (H)  Fasting labs are reviewed.  Goal of hemoglobin A1C of less than 7 discussed.  Instructed in the importance of diet, exercise, and good glycemic control in prevention of secondary complications.    Continue same medication regimen. Repeat fasting glucose and hemoglobin A1C in 6 months.     Dyslipidemia  Fasting labs reviewed.  Goals discussed.  Discussed the importance of diet, exercise, and weight reduction.  Follow up 12 months.     Prostate cancer (H)  PSA undetectable.  No further testing indicated    Need for prophylactic vaccination and inoculation against influenza  Immunization updated  - FLUZONE HIGH DOSE 65+  [03947]  - Pneumococcal vaccine 13 valent PCV13 IM (Prevnar) [63982]  - ADMIN INFLUENZA (For MEDICARE Patients ONLY) []  - ADMIN PNEUMOVAX VACCINE (For MEDICARE Patients ONLY) []            Rodolfo Cisneros MD  [facility] - [place]

## 2020-09-24 NOTE — NURSING NOTE
"Chief Complaint   Patient presents with     Diabetes     Lipids     Imm/Inj     Flu Shot       Initial /62 (BP Location: Right arm, Patient Position: Sitting, Cuff Size: Adult Regular)   Pulse (!) 48   Temp 98  F (36.7  C) (Tympanic)   Resp 18   Ht 1.803 m (5' 11\")   Wt 94.3 kg (208 lb)   SpO2 95%   BMI 29.01 kg/m   Estimated body mass index is 29.01 kg/m  as calculated from the following:    Height as of this encounter: 1.803 m (5' 11\").    Weight as of this encounter: 94.3 kg (208 lb).  Medication Reconciliation: complete  Skye Gonzalez LPN    "

## 2020-09-29 ENCOUNTER — OFFICE VISIT (OUTPATIENT)
Dept: FAMILY MEDICINE | Facility: OTHER | Age: 80
End: 2020-09-29
Attending: FAMILY MEDICINE
Payer: COMMERCIAL

## 2020-09-29 ENCOUNTER — NURSE TRIAGE (OUTPATIENT)
Dept: FAMILY MEDICINE | Facility: OTHER | Age: 80
End: 2020-09-29

## 2020-09-29 VITALS
HEART RATE: 72 BPM | WEIGHT: 208 LBS | HEIGHT: 71 IN | BODY MASS INDEX: 29.12 KG/M2 | OXYGEN SATURATION: 97 % | DIASTOLIC BLOOD PRESSURE: 60 MMHG | SYSTOLIC BLOOD PRESSURE: 102 MMHG

## 2020-09-29 DIAGNOSIS — J01.01 ACUTE RECURRENT MAXILLARY SINUSITIS: Primary | ICD-10-CM

## 2020-09-29 PROCEDURE — 99213 OFFICE O/P EST LOW 20 MIN: CPT | Performed by: FAMILY MEDICINE

## 2020-09-29 PROCEDURE — G0463 HOSPITAL OUTPT CLINIC VISIT: HCPCS

## 2020-09-29 ASSESSMENT — MIFFLIN-ST. JEOR: SCORE: 1680.61

## 2020-09-29 NOTE — NURSING NOTE
"Chief Complaint   Patient presents with     URI       Initial /60 (Patient Position: Sitting)   Pulse 72   Ht 1.803 m (5' 11\")   Wt 94.3 kg (208 lb)   SpO2 97%   BMI 29.01 kg/m   Estimated body mass index is 29.01 kg/m  as calculated from the following:    Height as of this encounter: 1.803 m (5' 11\").    Weight as of this encounter: 94.3 kg (208 lb).  Medication Reconciliation: complete  Jagruti Hagen LPN  "

## 2020-09-29 NOTE — TELEPHONE ENCOUNTER
"Patient is scheduled today for a sinus infection. Patient reports having a headache, fever, and sinus pain.    Reason for Disposition    HIGH RISK patient (e.g., age > 64 years, diabetes, heart or lung disease, weak immune system) (Exception: Has already been evaluated by healthcare provider and has no new or worsening symptoms)    Additional Information    Negative: SEVERE difficulty breathing (e.g., struggling for each breath, speaks in single words)    Negative: Difficult to awaken or acting confused (e.g., disoriented, slurred speech)    Negative: Bluish (or gray) lips or face now    Negative: Shock suspected (e.g., cold/pale/clammy skin, too weak to stand, low BP, rapid pulse)    Negative: Sounds like a life-threatening emergency to the triager    Negative: [1] COVID-19 exposure AND [2] no symptoms    Negative: COVID-19 and Breastfeeding, questions about    Negative: [1] Adult with possible COVID-19 symptoms AND [2] triager concerned about severity of symptoms or other causes    Negative: SEVERE or constant chest pain or pressure (Exception: mild central chest pain, present only when coughing)    Negative: MODERATE difficulty breathing (e.g., speaks in phrases, SOB even at rest, pulse 100-120)    Negative: Patient sounds very sick or weak to the triager    Negative: MILD difficulty breathing (e.g., minimal/no SOB at rest, SOB with walking, pulse <100)    Negative: Chest pain or pressure    Negative: Fever > 103 F (39.4 C)    Negative: [1] Fever > 101 F (38.3 C) AND [2] age > 60    Negative: [1] Fever > 100.0 F (37.8 C) AND [2] bedridden (e.g., nursing home patient, CVA, chronic illness, recovering from surgery)    Answer Assessment - Initial Assessment Questions  1. COVID-19 DIAGNOSIS: \"Who made your Coronavirus (COVID-19) diagnosis?\" \"Was it confirmed by a positive lab test?\" If not diagnosed by a HCP, ask \"Are there lots of cases (community spread) where you live?\" (See public health department website, if " "unsure)      Not diagnosed  2. ONSET: \"When did the COVID-19 symptoms start?\"       yesterday  3. WORST SYMPTOM: \"What is your worst symptom?\" (e.g., cough, fever, shortness of breath, muscle aches)      headache  4. COUGH: \"Do you have a cough?\" If so, ask: \"How bad is the cough?\"        No  5. FEVER: \"Do you have a fever?\" If so, ask: \"What is your temperature, how was it measured, and when did it start?\"      Yes. 100 oral  6. RESPIRATORY STATUS: \"Describe your breathing?\" (e.g., shortness of breath, wheezing, unable to speak)       good  7. BETTER-SAME-WORSE: \"Are you getting better, staying the same or getting worse compared to yesterday?\"  If getting worse, ask, \"In what way?\"      same  8. HIGH RISK DISEASE: \"Do you have any chronic medical problems?\" (e.g., asthma, heart or lung disease, weak immune system, etc.)      Pre diabetic  9. PREGNANCY: \"Is there any chance you are pregnant?\" \"When was your last menstrual period?\"      NA  10. OTHER SYMPTOMS: \"Do you have any other symptoms?\"  (e.g., chills, fatigue, headache, loss of smell or taste, muscle pain, sore throat)        Chills yesterday, achey,    Protocols used: CORONAVIRUS (COVID-19) DIAGNOSED OR [id number] 8.4.20      "

## 2020-09-29 NOTE — PROGRESS NOTES
"Subjective     Efrain Weiner is a 80 year old male who presents to clinic today for the following health issues:    HPI       Acute Illness  Acute illness concerns:  Sinus pain, fever, headache   Onset/Duration: 3 days   Symptoms:  Fever: YES  Chills/Sweats: no  Headache (location?): YES  Sinus Pressure: YES  Conjunctivitis:  no  Ear Pain: YES- right ear   Rhinorrhea: no  Congestion: no  Sore Throat: no  Cough: no  Wheeze: no  Decreased Appetite: no  Nausea: no  Vomiting: no  Diarrhea: no  Dysuria/Freq.: no  Dysuria or Hematuria: no  Fatigue/Achiness: YES- body aches   Sick/Strep Exposure: no  Therapies tried and outcome: amoxicillin, tylenol, ibuprofen     Right sided facial/tooth pain. Right ear plugged. Augmentin at home and took for a couple days. States he usually only takes 2-3 days worth of medication for sinus infection and fever resolved, pain improved significantly.     Review of Systems   Constitutional, HEENT, cardiovascular, pulmonary, gi and gu systems are negative, except as otherwise noted.      Objective    /60 (Patient Position: Sitting)   Pulse 72   Ht 1.803 m (5' 11\")   Wt 94.3 kg (208 lb)   SpO2 97%   BMI 29.01 kg/m    Body mass index is 29.01 kg/m .  Physical Exam   GENERAL: alert and no distress  HENT: ear canals and TM's normal, nose and mouth without ulcers or lesions  NECK: no adenopathy, no asymmetry, masses, or scars and thyroid normal to palpation  RESP: lungs clear to auscultation - no rales, rhonchi or wheezes  CV: regular rates and rhythm, normal S1 S2, no S3 or S4, no murmur, click or rub and no peripheral edema  ABDOMEN: soft, nontender, no hepatosplenomegaly, no masses and bowel sounds normal  MS: no gross musculoskeletal defects noted, no edema    No results found for any visits on 09/29/20.        Assessment & Plan     Acute recurrent maxillary sinusitis  Patient has already started augmentin and is clearly improved. Given symptoms are so clearly unilateral will " continue for 7 day course. I do not feel patient has COVID 19, however, we discussed testing in the setting of the fever a couple days ago. He declines. Recommended self quarantine for himself and his wife for a total of 10 days from symptom onset. He understands and is agreeable to recommendations.     Return if symptoms worsen or fail to improve.    Valerie Faria MD  [facility]

## 2021-04-16 ENCOUNTER — TRANSFERRED RECORDS (OUTPATIENT)
Dept: HEALTH INFORMATION MANAGEMENT | Facility: CLINIC | Age: 81
End: 2021-04-16

## 2021-07-30 DIAGNOSIS — M19.90 OSTEOARTHRITIS, UNSPECIFIED OSTEOARTHRITIS TYPE, UNSPECIFIED SITE: ICD-10-CM

## 2021-07-30 NOTE — TELEPHONE ENCOUNTER
Relafen       Last Written Prescription Date:  8/20/2020  Last Fill Quantity: 180,   # refills: 1  Last Office Visit: 9/29/2020  Future Office visit:

## 2021-09-23 ENCOUNTER — NURSE TRIAGE (OUTPATIENT)
Dept: FAMILY MEDICINE | Facility: OTHER | Age: 81
End: 2021-09-23

## 2021-09-23 ENCOUNTER — OFFICE VISIT (OUTPATIENT)
Dept: FAMILY MEDICINE | Facility: OTHER | Age: 81
End: 2021-09-23
Attending: FAMILY MEDICINE
Payer: MEDICARE

## 2021-09-23 DIAGNOSIS — Z20.822 COVID-19 RULED OUT: Primary | ICD-10-CM

## 2021-09-23 DIAGNOSIS — Z20.822 COVID-19 RULED OUT: ICD-10-CM

## 2021-09-23 PROCEDURE — U0003 INFECTIOUS AGENT DETECTION BY NUCLEIC ACID (DNA OR RNA); SEVERE ACUTE RESPIRATORY SYNDROME CORONAVIRUS 2 (SARS-COV-2) (CORONAVIRUS DISEASE [COVID-19]), AMPLIFIED PROBE TECHNIQUE, MAKING USE OF HIGH THROUGHPUT TECHNOLOGIES AS DESCRIBED BY CMS-2020-01-R: HCPCS | Mod: ZL

## 2021-09-23 NOTE — TELEPHONE ENCOUNTER
"    Reason for Disposition    [1] COVID-19 vaccine series completed (fully vaccinated) AND [2] COVID-19 EXPOSURE AND [3] no symptoms    Answer Assessment - Initial Assessment Questions  1. COVID-19 CLOSE CONTACT: \"Who is the person with the confirmed or suspected COVID-19 infection that you were exposed to?\"      friend  2. PLACE of CONTACT: \"Where were you when you were exposed to COVID-19?\" (e.g., home, school, medical waiting room; which city?)      [place]  3. TYPE of CONTACT: \"How much contact was there?\" (e.g., sitting next to, live in same house, work in same office, same building)      sitting  4. DURATION of CONTACT: \"How long were you in contact with the COVID-19 patient?\" (e.g., a few seconds, passed by person, a few minutes, 15 minutes or longer, live with the patient)      hours  5. MASK: \"Were you wearing a mask?\" \"Was the other person wearing a mask?\" Note: wearing a mask reduces the risk of an otherwise close contact.      no  6. DATE of CONTACT: \"When did you have contact with a COVID-19 patient?\" (e.g., how many days ago)      9/17  7. COMMUNITY SPREAD: \"Are there lots of cases of COVID-19 (community spread) where you live?\" (See public health department website, if unsure)        yes  8. SYMPTOMS: \"Do you have any symptoms?\" (e.g., fever, cough, breathing difficulty, loss of taste or smell)      no  9. PREGNANCY OR POSTPARTUM: \"Is there any chance you are pregnant?\" \"When was your last menstrual period?\" \"Did you deliver in the last 2 weeks?\"      no  10. HIGH RISK: \"Do you have any heart or lung problems?\" \"Do you have a weak immune system?\" (e.g., heart failure, COPD, asthma, HIV positive, chemotherapy, renal failure, diabetes mellitus, sickle cell anemia, obesity)        no  11. TRAVEL: \"Have you traveled out of the country recently?\" If so, \"When and where?\" Also ask about out-of-state travel, since the CDC has identified some high-risk cities for community spread in the [place]. Note: Travel becomes " less relevant if there is widespread community transmission where the patient lives.        no    Protocols used: CORONAVIRUS (COVID-19) EXPOSURE-A-AH 3.25

## 2021-09-25 LAB — SARS-COV-2 RNA RESP QL NAA+PROBE: NEGATIVE

## 2021-09-27 ENCOUNTER — TELEPHONE (OUTPATIENT)
Dept: FAMILY MEDICINE | Facility: OTHER | Age: 81
End: 2021-09-27

## 2021-12-07 DIAGNOSIS — H91.93 DECREASED HEARING OF BOTH EARS: Primary | ICD-10-CM

## 2021-12-08 ENCOUNTER — OFFICE VISIT (OUTPATIENT)
Dept: AUDIOLOGY | Facility: OTHER | Age: 81
End: 2021-12-08
Attending: AUDIOLOGIST
Payer: MEDICARE

## 2021-12-08 ENCOUNTER — TELEPHONE (OUTPATIENT)
Dept: OTOLARYNGOLOGY | Facility: OTHER | Age: 81
End: 2021-12-08

## 2021-12-08 DIAGNOSIS — H90.3 SENSORINEURAL HEARING LOSS (SNHL) OF BOTH EARS: ICD-10-CM

## 2021-12-08 DIAGNOSIS — H91.93 DECREASED HEARING OF BOTH EARS: ICD-10-CM

## 2021-12-08 DIAGNOSIS — H69.93 DYSFUNCTION OF EUSTACHIAN TUBE, BILATERAL: Primary | ICD-10-CM
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
## 2021-12-08 PROBLEM — S22.040A: Status: ACTIVE | Noted: 2017-05-26

## 2021-12-08 PROBLEM — S01.01XA SCALP LACERATION, INITIAL ENCOUNTER: Status: ACTIVE | Noted: 2017-05-26

## 2021-12-08 PROBLEM — I10 ESSENTIAL HYPERTENSION: Status: ACTIVE | Noted: 2017-05-26

## 2021-12-08 PROCEDURE — 92550 TYMPANOMETRY & REFLEX THRESH: CPT | Performed by: AUDIOLOGIST

## 2021-12-08 PROCEDURE — 92557 COMPREHENSIVE HEARING TEST: CPT | Performed by: AUDIOLOGIST

## 2021-12-08 NOTE — PROGRESS NOTES
Audiology Evaluation Completed. Please refer SCANNED AUDIOGRAM and/or TYMPANOGRAM for BACKGROUND, RESULTS, RECOMMENDATIONS.      Ilda ELIZALDE, [facility]  Audiologist #[id number]

## 2021-12-08 NOTE — TELEPHONE ENCOUNTER
"I called and spoke with the patient and let him know that ENT providers will not prescribe anything until he is seen. He said that he heard that drops could \"help open up his ears\". He will follow up with ENT as scheduled.   "

## 2021-12-08 NOTE — TELEPHONE ENCOUNTER
Patient stated that he came in on 12/08/21 and got his ears checked. He thought that he was going to get drops put in because he has been without hearing for a week now. He sees Marcia on 12/14/21 and is wondering if he is able to get these drops before hand or if he is able to get a appointment that might be sooner than he already has scheduled.                   Rina Ovalles

## 2021-12-13 NOTE — TELEPHONE ENCOUNTER
Patient called back and requesting ear drops. States he was seen and had his hearing checked and wants drops due to not being able to hear. Patient states this has happened before and the drops helped. Patient has an appointment with ENT tomorrow. This writer advised for patient to keep this appointment and explained to patient that per nurse, providers will not prescribe anything until he is seen. Patient said he cancelled his appointment on Friday. Advised patient appointment is schedule for tomorrow, 12/14, with Marcia Camara for arrival time of 9:30. Patient verbalized understanding and will come to appointment, but will be upset if he comes and there is no appointment.

## 2021-12-14 ENCOUNTER — OFFICE VISIT (OUTPATIENT)
Dept: OTOLARYNGOLOGY | Facility: OTHER | Age: 81
End: 2021-12-14
Attending: PHYSICIAN ASSISTANT
Payer: COMMERCIAL

## 2021-12-14 VITALS
HEART RATE: 87 BPM | HEIGHT: 71 IN | WEIGHT: 205 LBS | DIASTOLIC BLOOD PRESSURE: 72 MMHG | OXYGEN SATURATION: 97 % | SYSTOLIC BLOOD PRESSURE: 124 MMHG | BODY MASS INDEX: 28.7 KG/M2 | TEMPERATURE: 97.1 F

## 2021-12-14 DIAGNOSIS — H93.8X3 SENSATION OF FULLNESS IN BOTH EARS: ICD-10-CM

## 2021-12-14 DIAGNOSIS — H90.3 SENSORINEURAL HEARING LOSS (SNHL) OF BOTH EARS: ICD-10-CM

## 2021-12-14 DIAGNOSIS — H69.93 DYSFUNCTION OF BOTH EUSTACHIAN TUBES: Primary | ICD-10-CM

## 2021-12-14 PROCEDURE — 99213 OFFICE O/P EST LOW 20 MIN: CPT | Mod: 25 | Performed by: PHYSICIAN ASSISTANT

## 2021-12-14 PROCEDURE — G0463 HOSPITAL OUTPT CLINIC VISIT: HCPCS

## 2021-12-14 PROCEDURE — 92504 EAR MICROSCOPY EXAMINATION: CPT

## 2021-12-14 PROCEDURE — 92504 EAR MICROSCOPY EXAMINATION: CPT | Performed by: PHYSICIAN ASSISTANT

## 2021-12-14 RX ORDER — FLUTICASONE PROPIONATE 50 MCG
SPRAY, SUSPENSION (ML) NASAL
Qty: 16 G | Refills: 3 | Status: SHIPPED | OUTPATIENT
Start: 2021-12-14 | End: 2022-08-17

## 2021-12-14 ASSESSMENT — MIFFLIN-ST. JEOR: SCORE: 1662

## 2021-12-14 ASSESSMENT — PAIN SCALES - GENERAL: PAINLEVEL: NO PAIN (0)

## 2021-12-14 NOTE — LETTER
2021         RE: Efrain Weiner  [address] [facility] [address]        Dear Colleague,    Thank you for referring your patient, Efrain Weiner, to the [facility]. Please see a copy of my visit note below.    Otolaryngology Consultation    Patient: Efrain Weiner  : 1940    Patient presents with:  Ear Problem: Bilateral ETD, bilateral SNHL, and decreased hearing bilateral ears.      HPI:  Efrain Weiner is a 81 year old male seen today for SNHL, Decreased hearing.   He had c/o ear fullness, congestion, plugged ear feeling for the last week following URI symptoms.   Patient has hearing aids from outside facility.     Art presents for decreased hearing.   Art had a congestion, head cold following Thanksgiving. He has felt his hearing has been decreased since the illness. He did have cold like symptoms for several days, but has bothered his ears. He has continued muffled feeling in his ears, congestion.   Art has felt his ears popping, crackling in his ears.   He has felt there is about 5% improvement since the illness.   Reports past sinuses have been fairly controlled  TERESO on CPAP.     Denies COM or otologic surgeries.   Denies otalgia, otorrhea.  Denies worrisome tinnitus.  Denies fluctuating hearing loss or tinnitus.   Denies vertigo or facial paraesthesia.   No concerns with seasonal allergies.         Audiogram- 21  Type As tympanograms  Thresholds are moderate loss sloping to severe SNHL  SRT=PTA  WRS-  Right- 80%@80dB  Left- 80% @75 dB    Current Outpatient Rx   Medication Sig Dispense Refill     ACCU-CHEK MULTICLIX LANCETS MISC Use to test blood sugar 1 time daily or as directed. 102 each 8     blood glucose monitoring (ACCU-CHEK LIZETTE) test strip Use to test blood sugars 1 time daily or as directed. 1 Box 3     Blood Glucose Monitoring Suppl (ACCU-CHEK LIZETTE PLUS) W/DEVICE KIT Use to test blood sugars 1 time daily or as directed. 1 kit 0     Cholecalciferol  (VITAMIN D) 2000 UNITS tablet Take 2,000 Units by mouth daily 100 tablet 3     DiphenhydrAMINE HCl, Sleep, 25 MG CAPS Take 1 tablet by mouth bedtime       ibuprofen (ADVIL,MOTRIN) 200 MG tablet Take 1 tablet by mouth every 6 hours as needed. With food       nabumetone (RELAFEN) 750 MG tablet TAKE ONE (1) TABLET BY MOUTH TWICE DAILY WITH FOOD 180 tablet 1     order for DME Equipment being ordered: CPAP supplies 1 Device 0     PSEUDOEPHEDRINE HCL 60 MG OR TABS daily       simvastatin (ZOCOR) 40 MG tablet TAKE ONE (1) TABLET BY MOUTH EVERY OTHER DAY 45 tablet 0       Allergies: Nkda [no known drug allergies]     Past Medical History:   Diagnosis Date     Allergic rhinitis, seasonal 3/25/2011     BPH 2005     Cancer, Prostate 3/25/2011     Chronic prostatitis 2007     Diabetes Mellitus, Type 2 2004     Dyslipidemia 3/25/2011     Eczema 3/25/2011     Erectile Dysfunction 3/25/2011     Mixed hyperlipidemia      Nephrolithiasis 2011     TERESO on CPAP      Osteoarthritis, Generalized 3/25/2011       Past Surgical History:   Procedure Laterality Date     BIOPSY PROSTATE TRANSRECTAL       cataract extraction      right     COLONOSCOPY      2006     COLONOSCOPY  2003     excision of acrochordon       excision of peritonsillar abscess       kidney stones blasted       laparoscopic bilateral inguinal hernia repair  2011    Bilateral inguinal hernia     prostatectomy  2011    Adenocarcinoma of the prostate     shoulder arthroscopy with Apm procedure      right     VASECTOMY         ENT family history reviewed    Social History     Tobacco Use     Smoking status: Former Smoker     Types: Cigarettes     Quit date: 1969     Years since quittin.9     Smokeless tobacco: Never Used     Tobacco comment: no passive smoke exposure   Substance Use Topics     Alcohol use: Yes     Comment: socially     Drug use: No       Review of Systems  ROS: 10 point ROS neg other than the symptoms noted above in  "the HPI     Physical Exam  /72 (Cuff Size: Adult Regular)   Pulse 87   Temp 97.1  F (36.2  C) (Tympanic)   Ht 1.803 m (5' 11\")   Wt 93 kg (205 lb)   SpO2 97%   BMI 28.59 kg/m    General - The patient is well nourished and well developed, and appears to have good nutritional status.  Alert and oriented to person and place, answers questions and cooperates with examination appropriately.   Head and Face - Normocephalic and atraumatic, with no gross asymmetry noted.  The facial nerve is intact, with strong symmetric movements.  Voice and Breathing - The patient was breathing comfortably without the use of accessory muscles. There was no wheezing, stridor, or stertor.  The patients voice was clear and strong, and had appropriate pitch and quality.  Ears - Ears examined with otoscope and under otologic microscopy. The external auditory canals are patent, the tympanic membranes are intact without effusion, retraction or mass. Tympanic membranes have tympanosclerosis.  No significant effusion noted. Bony landmarks are intact.   Eyes - Extraocular movements intact, and the pupils were reactive to light.  Sclera were not icteric or injected, conjunctiva were pink and moist.  Mouth - Examination of the oral cavity showed pink, healthy oral mucosa. No lesions or ulcerations noted.  The tongue was mobile and midline, and the dentition were in good condition.    Throat - The walls of the oropharynx were smooth, pink, moist, symmetric, and had no lesions or ulcerations.  The tonsillar pillars and soft palate were symmetric.  The uvula was midline on elevation.    Neck - Normal midline excursion of the laryngotracheal complex during swallowing.  Full range of motion on passive movement.  Palpation of the occipital, submental, submandibular, internal jugular chain, and supraclavicular nodes did not demonstrate any abnormal lymph nodes or masses.  Palpation of the thyroid was soft and smooth, with no nodules or goiter " appreciated.  The trachea was mobile and midline.  Nose - External contour is symmetric, no gross deflection or scars.  Nasal mucosa is pink and moist with no abnormal mucus.        Impression and Plan- Efrain Weiner is a 81 year old male with:    ICD-10-CM    1. Dysfunction of both eustachian tubes  H69.83 fluticasone (FLONASE) 50 MCG/ACT nasal spray   2. Sensation of fullness in both ears  H93.8X3    3. Sensorineural hearing loss (SNHL) of both ears  H90.3      Release of prior audiogram to ensure stable thresholds.     No significant effusions seen on exam. Will start Flonase at this time.   Recommended starting nasal sprays and maintaining at home, AH. If there is no relief, may consider further options of myringotomy w/ possible nasal exam.   However, his audiogram is without a conductive component at this time.     He will return if there is no relief in 2-4 weeks.       Marcia Camara PA-C  ENT  [facility], [place]          Again, thank you for allowing me to participate in the care of your patient.        Sincerely,        Marcia Camara PA-C

## 2021-12-14 NOTE — NURSING NOTE
"Chief Complaint   Patient presents with     Ear Problem     Bilateral ETD, bilateral SNHL, and decreased hearing bilateral ears.       Initial /72 (Cuff Size: Adult Regular)   Pulse 87   Temp 97.1  F (36.2  C) (Tympanic)   Ht 1.803 m (5' 11\")   Wt 93 kg (205 lb)   SpO2 97%   BMI 28.59 kg/m   Estimated body mass index is 28.59 kg/m  as calculated from the following:    Height as of this encounter: 1.803 m (5' 11\").    Weight as of this encounter: 93 kg (205 lb).  Medication Reconciliation: complete  Rama Small LPN    "

## 2021-12-14 NOTE — PROGRESS NOTES
Otolaryngology Consultation    Patient: Efrain Weiner  : 1940    Patient presents with:  Ear Problem: Bilateral ETD, bilateral SNHL, and decreased hearing bilateral ears.      HPI:  Efrain Weiner is a 81 year old male seen today for SNHL, Decreased hearing.   He had c/o ear fullness, congestion, plugged ear feeling for the last week following URI symptoms.   Patient has hearing aids from outside facility.     Art presents for decreased hearing.   Art had a congestion, head cold following Thanksgiving. He has felt his hearing has been decreased since the illness. He did have cold like symptoms for several days, but has bothered his ears. He has continued muffled feeling in his ears, congestion.   Art has felt his ears popping, crackling in his ears.   He has felt there is about 5% improvement since the illness.   Reports past sinuses have been fairly controlled  TERESO on CPAP.     Denies COM or otologic surgeries.   Denies otalgia, otorrhea.  Denies worrisome tinnitus.  Denies fluctuating hearing loss or tinnitus.   Denies vertigo or facial paraesthesia.   No concerns with seasonal allergies.         Audiogram- 21  Type As tympanograms  Thresholds are moderate loss sloping to severe SNHL  SRT=PTA  WRS-  Right- 80%@80dB  Left- 80% @75 dB    Current Outpatient Rx   Medication Sig Dispense Refill     ACCU-CHEK MULTICLIX LANCETS MISC Use to test blood sugar 1 time daily or as directed. 102 each 8     blood glucose monitoring (ACCU-CHEK LIZETTE) test strip Use to test blood sugars 1 time daily or as directed. 1 Box 3     Blood Glucose Monitoring Suppl (ACCU-CHEK LIZETTE PLUS) W/DEVICE KIT Use to test blood sugars 1 time daily or as directed. 1 kit 0     Cholecalciferol (VITAMIN D) 2000 UNITS tablet Take 2,000 Units by mouth daily 100 tablet 3     DiphenhydrAMINE HCl, Sleep, 25 MG CAPS Take 1 tablet by mouth bedtime       ibuprofen (ADVIL,MOTRIN) 200 MG tablet Take 1 tablet by mouth every 6 hours as needed. With  "food       nabumetone (RELAFEN) 750 MG tablet TAKE ONE (1) TABLET BY MOUTH TWICE DAILY WITH FOOD 180 tablet 1     order for DME Equipment being ordered: CPAP supplies 1 Device 0     PSEUDOEPHEDRINE HCL 60 MG OR TABS daily       simvastatin (ZOCOR) 40 MG tablet TAKE ONE (1) TABLET BY MOUTH EVERY OTHER DAY 45 tablet 0       Allergies: Nkda [no known drug allergies]     Past Medical History:   Diagnosis Date     Allergic rhinitis, seasonal 3/25/2011     BPH 2005     Cancer, Prostate 3/25/2011     Chronic prostatitis 2007     Diabetes Mellitus, Type 2 2004     Dyslipidemia 3/25/2011     Eczema 3/25/2011     Erectile Dysfunction 3/25/2011     Mixed hyperlipidemia      Nephrolithiasis 2011     TERESO on CPAP      Osteoarthritis, Generalized 3/25/2011       Past Surgical History:   Procedure Laterality Date     BIOPSY PROSTATE TRANSRECTAL       cataract extraction      right     COLONOSCOPY           COLONOSCOPY  2003     excision of acrochordon       excision of peritonsillar abscess       kidney stones blasted       laparoscopic bilateral inguinal hernia repair  2011    Bilateral inguinal hernia     prostatectomy  2011    Adenocarcinoma of the prostate     shoulder arthroscopy with Pam procedure      right     VASECTOMY         ENT family history reviewed    Social History     Tobacco Use     Smoking status: Former Smoker     Types: Cigarettes     Quit date: 1969     Years since quittin.9     Smokeless tobacco: Never Used     Tobacco comment: no passive smoke exposure   Substance Use Topics     Alcohol use: Yes     Comment: socially     Drug use: No       Review of Systems  ROS: 10 point ROS neg other than the symptoms noted above in the HPI     Physical Exam  /72 (Cuff Size: Adult Regular)   Pulse 87   Temp 97.1  F (36.2  C) (Tympanic)   Ht 1.803 m (5' 11\")   Wt 93 kg (205 lb)   SpO2 97%   BMI 28.59 kg/m    General - The patient is well nourished and well " developed, and appears to have good nutritional status.  Alert and oriented to person and place, answers questions and cooperates with examination appropriately.   Head and Face - Normocephalic and atraumatic, with no gross asymmetry noted.  The facial nerve is intact, with strong symmetric movements.  Voice and Breathing - The patient was breathing comfortably without the use of accessory muscles. There was no wheezing, stridor, or stertor.  The patients voice was clear and strong, and had appropriate pitch and quality.  Ears - Ears examined with otoscope and under otologic microscopy. The external auditory canals are patent, the tympanic membranes are intact without effusion, retraction or mass. Tympanic membranes have tympanosclerosis.  No significant effusion noted. Bony landmarks are intact.   Eyes - Extraocular movements intact, and the pupils were reactive to light.  Sclera were not icteric or injected, conjunctiva were pink and moist.  Mouth - Examination of the oral cavity showed pink, healthy oral mucosa. No lesions or ulcerations noted.  The tongue was mobile and midline, and the dentition were in good condition.    Throat - The walls of the oropharynx were smooth, pink, moist, symmetric, and had no lesions or ulcerations.  The tonsillar pillars and soft palate were symmetric.  The uvula was midline on elevation.    Neck - Normal midline excursion of the laryngotracheal complex during swallowing.  Full range of motion on passive movement.  Palpation of the occipital, submental, submandibular, internal jugular chain, and supraclavicular nodes did not demonstrate any abnormal lymph nodes or masses.  Palpation of the thyroid was soft and smooth, with no nodules or goiter appreciated.  The trachea was mobile and midline.  Nose - External contour is symmetric, no gross deflection or scars.  Nasal mucosa is pink and moist with no abnormal mucus.        Impression and Plan- Efrain QUINTIN Weiner is a 81 year old male  with:    ICD-10-CM    1. Dysfunction of both eustachian tubes  H69.83 fluticasone (FLONASE) 50 MCG/ACT nasal spray   2. Sensation of fullness in both ears  H93.8X3    3. Sensorineural hearing loss (SNHL) of both ears  H90.3      Release of prior audiogram to ensure stable thresholds.     No significant effusions seen on exam. Will start Flonase at this time.   Recommended starting nasal sprays and maintaining at home, . If there is no relief, may consider further options of myringotomy w/ possible nasal exam.   However, his audiogram is without a conductive component at this time.     He will return if there is no relief in 2-4 weeks.       Marcia Camara PA-C  ENT  [facility], [place]

## 2021-12-14 NOTE — PATIENT INSTRUCTIONS
Start Flonase 2 sprays to each nostril twice a day for 2 weeks, then use daily.   Continue with home allergy medications.   If no improvement- contact nurse      Thank you for allowing Marcia Camara PA-C and our ENT team to participate in your care.  If your medications are too expensive, please give the nurse a call.  We can possibly change this medication.  If you have a scheduling or an appointment question please contact our Health Unit Coordinator at [phone], Ext. [phone].    ALL nursing questions or concerns can be directed to your ENT nurse at: [phone] Deanne

## 2022-02-15 ENCOUNTER — TELEPHONE (OUTPATIENT)
Dept: FAMILY MEDICINE | Facility: OTHER | Age: 82
End: 2022-02-15
Payer: COMMERCIAL

## 2022-02-15 DIAGNOSIS — E11.9 TYPE 2 DIABETES MELLITUS WITHOUT COMPLICATION, WITHOUT LONG-TERM CURRENT USE OF INSULIN (H): Primary | ICD-10-CM

## 2022-02-15 NOTE — TELEPHONE ENCOUNTER
8:30 AM    Reason for Call: Phone Call    Description: Patient called and states that he would like to have labs ordered prior to his appt on 02/22/22. Patient states that he always has labs done a few days in advance so that they are ready to be discussed at the appt. Please call patient if this is something that can be done.     Was an appointment offered for this call? No  If yes : Appointment type              Date    Preferred method for responding to this message: Telephone Call  What is your phone number ? [phone]    If we cannot reach you directly, may we leave a detailed response at the number you provided? Yes    Can this message wait until your PCP/provider returns, if available today? YES, provider is out today     Delma Corea

## 2022-02-18 ENCOUNTER — LAB (OUTPATIENT)
Dept: LAB | Facility: OTHER | Age: 82
End: 2022-02-18
Payer: MEDICARE

## 2022-02-18 DIAGNOSIS — E11.9 TYPE 2 DIABETES MELLITUS WITHOUT COMPLICATION, WITHOUT LONG-TERM CURRENT USE OF INSULIN (H): ICD-10-CM

## 2022-02-18 LAB
ALBUMIN SERPL-MCNC: 3.1 G/DL (ref 3.4–5)
ALP SERPL-CCNC: 98 U/L (ref 40–150)
ALT SERPL W P-5'-P-CCNC: 17 U/L (ref 0–70)
ANION GAP SERPL CALCULATED.3IONS-SCNC: 5 MMOL/L (ref 3–14)
AST SERPL W P-5'-P-CCNC: 14 U/L (ref 0–45)
BILIRUB SERPL-MCNC: 1.1 MG/DL (ref 0.2–1.3)
BUN SERPL-MCNC: 18 MG/DL (ref 7–30)
CALCIUM SERPL-MCNC: 8.3 MG/DL (ref 8.5–10.1)
CHLORIDE BLD-SCNC: 108 MMOL/L (ref 94–109)
CHOLEST SERPL-MCNC: 176 MG/DL
CO2 SERPL-SCNC: 26 MMOL/L (ref 20–32)
CREAT SERPL-MCNC: 1.13 MG/DL (ref 0.66–1.25)
CREAT UR-MCNC: 115 MG/DL
EST. AVERAGE GLUCOSE BLD GHB EST-MCNC: 192 MG/DL
FASTING STATUS PATIENT QL REPORTED: YES
GFR SERPL CREATININE-BSD FRML MDRD: 65 ML/MIN/1.73M2
GLUCOSE BLD-MCNC: 145 MG/DL (ref 70–99)
HBA1C MFR BLD: 8.3 % (ref 0–5.6)
HDLC SERPL-MCNC: 52 MG/DL
LDLC SERPL CALC-MCNC: 112 MG/DL
MICROALBUMIN UR-MCNC: 26 MG/L
MICROALBUMIN/CREAT UR: 22.61 MG/G CR (ref 0–17)
NONHDLC SERPL-MCNC: 124 MG/DL
POTASSIUM BLD-SCNC: 4.2 MMOL/L (ref 3.4–5.3)
PROT SERPL-MCNC: 7.7 G/DL (ref 6.8–8.8)
SODIUM SERPL-SCNC: 139 MMOL/L (ref 133–144)
TRIGL SERPL-MCNC: 62 MG/DL
TSH SERPL DL<=0.005 MIU/L-ACNC: 1.72 MU/L (ref 0.4–4)

## 2022-02-18 PROCEDURE — 82043 UR ALBUMIN QUANTITATIVE: CPT | Mod: ZL

## 2022-02-18 PROCEDURE — 83036 HEMOGLOBIN GLYCOSYLATED A1C: CPT | Mod: ZL

## 2022-02-18 PROCEDURE — 80053 COMPREHEN METABOLIC PANEL: CPT | Mod: ZL

## 2022-02-18 PROCEDURE — 36415 COLL VENOUS BLD VENIPUNCTURE: CPT | Mod: ZL

## 2022-02-18 PROCEDURE — 80061 LIPID PANEL: CPT | Mod: ZL

## 2022-02-18 PROCEDURE — 84443 ASSAY THYROID STIM HORMONE: CPT | Mod: ZL

## 2022-02-22 ENCOUNTER — OFFICE VISIT (OUTPATIENT)
Dept: FAMILY MEDICINE | Facility: OTHER | Age: 82
End: 2022-02-22
Attending: FAMILY MEDICINE
Payer: COMMERCIAL

## 2022-02-22 VITALS
HEIGHT: 71 IN | WEIGHT: 210 LBS | DIASTOLIC BLOOD PRESSURE: 70 MMHG | BODY MASS INDEX: 29.4 KG/M2 | OXYGEN SATURATION: 96 % | SYSTOLIC BLOOD PRESSURE: 132 MMHG | TEMPERATURE: 97.6 F | HEART RATE: 77 BPM

## 2022-02-22 DIAGNOSIS — G47.33 OSA (OBSTRUCTIVE SLEEP APNEA): ICD-10-CM

## 2022-02-22 DIAGNOSIS — Z23 NEED FOR PROPHYLACTIC VACCINATION AND INOCULATION AGAINST INFLUENZA: ICD-10-CM

## 2022-02-22 DIAGNOSIS — Z00.00 ENCOUNTER FOR MEDICARE ANNUAL WELLNESS EXAM: ICD-10-CM

## 2022-02-22 DIAGNOSIS — E11.9 TYPE 2 DIABETES MELLITUS WITHOUT COMPLICATION, WITHOUT LONG-TERM CURRENT USE OF INSULIN (H): Primary | ICD-10-CM

## 2022-02-22 PROCEDURE — G0438 PPPS, INITIAL VISIT: HCPCS | Performed by: FAMILY MEDICINE

## 2022-02-22 RX ORDER — METFORMIN HCL 500 MG
500 TABLET, EXTENDED RELEASE 24 HR ORAL
Qty: 90 TABLET | Refills: 3 | Status: SHIPPED | OUTPATIENT
Start: 2022-02-22 | End: 2022-08-17 | Stop reason: SINTOL

## 2022-02-22 ASSESSMENT — ACTIVITIES OF DAILY LIVING (ADL): CURRENT_FUNCTION: NO ASSISTANCE NEEDED

## 2022-02-22 ASSESSMENT — ANXIETY QUESTIONNAIRES
7. FEELING AFRAID AS IF SOMETHING AWFUL MIGHT HAPPEN: NOT AT ALL
GAD7 TOTAL SCORE: 0
2. NOT BEING ABLE TO STOP OR CONTROL WORRYING: NOT AT ALL
3. WORRYING TOO MUCH ABOUT DIFFERENT THINGS: NOT AT ALL
1. FEELING NERVOUS, ANXIOUS, OR ON EDGE: NOT AT ALL
5. BEING SO RESTLESS THAT IT IS HARD TO SIT STILL: NOT AT ALL
IF YOU CHECKED OFF ANY PROBLEMS ON THIS QUESTIONNAIRE, HOW DIFFICULT HAVE THESE PROBLEMS MADE IT FOR YOU TO DO YOUR WORK, TAKE CARE OF THINGS AT HOME, OR GET ALONG WITH OTHER PEOPLE: NOT DIFFICULT AT ALL
6. BECOMING EASILY ANNOYED OR IRRITABLE: NOT AT ALL

## 2022-02-22 ASSESSMENT — PATIENT HEALTH QUESTIONNAIRE - PHQ9
SUM OF ALL RESPONSES TO PHQ QUESTIONS 1-9: 0
5. POOR APPETITE OR OVEREATING: NOT AT ALL

## 2022-02-22 ASSESSMENT — PAIN SCALES - GENERAL: PAINLEVEL: MILD PAIN (2)

## 2022-02-22 NOTE — PATIENT INSTRUCTIONS
Patient Education   Personalized Prevention Plan  You are due for the preventive services outlined below.  Your care team is available to assist you in scheduling these services.  If you have already completed any of these items, please share that information with your care team to update in your medical record.  Health Maintenance Due   Topic Date Due     Diabetic Foot Exam  Never done     Discuss Advance Care Planning  Never done     Zoster (Shingles) Vaccine (1 of 2) Never done     Annual Wellness Visit  Never done     Pneumococcal Vaccine (1 of 1 - PPSV23) Never done     COVID-19 Vaccine (2 - Moderna 3-dose series) 03/05/2021     Eye Exam  04/01/2021     Flu Vaccine (1) 09/01/2021     FALL RISK ASSESSMENT  09/29/2021     PHQ-2  01/01/2022

## 2022-02-22 NOTE — NURSING NOTE
"Chief Complaint   Patient presents with     Establish Care     Physical       Initial /70   Pulse 77   Temp 97.6  F (36.4  C)   Ht 1.791 m (5' 10.5\")   Wt 95.3 kg (210 lb)   SpO2 96%   BMI 29.71 kg/m   Estimated body mass index is 29.71 kg/m  as calculated from the following:    Height as of this encounter: 1.791 m (5' 10.5\").    Weight as of this encounter: 95.3 kg (210 lb).  Medication Reconciliation: complete  Dayana Reinoso LPN  "

## 2022-02-23 ASSESSMENT — ANXIETY QUESTIONNAIRES: GAD7 TOTAL SCORE: 0

## 2022-05-05 ENCOUNTER — LAB (OUTPATIENT)
Dept: LAB | Facility: OTHER | Age: 82
End: 2022-05-05
Payer: MEDICARE

## 2022-05-05 DIAGNOSIS — E11.9 TYPE 2 DIABETES MELLITUS WITHOUT COMPLICATION, WITHOUT LONG-TERM CURRENT USE OF INSULIN (H): ICD-10-CM

## 2022-05-05 LAB
ANION GAP SERPL CALCULATED.3IONS-SCNC: 1 MMOL/L (ref 3–14)
BUN SERPL-MCNC: 19 MG/DL (ref 7–30)
CALCIUM SERPL-MCNC: 8.9 MG/DL (ref 8.5–10.1)
CHLORIDE BLD-SCNC: 108 MMOL/L (ref 94–109)
CO2 SERPL-SCNC: 29 MMOL/L (ref 20–32)
CREAT SERPL-MCNC: 1.13 MG/DL (ref 0.66–1.25)
EST. AVERAGE GLUCOSE BLD GHB EST-MCNC: 171 MG/DL
GFR SERPL CREATININE-BSD FRML MDRD: 65 ML/MIN/1.73M2
GLUCOSE BLD-MCNC: 161 MG/DL (ref 70–99)
HBA1C MFR BLD: 7.6 % (ref 0–5.6)
POTASSIUM BLD-SCNC: 4.4 MMOL/L (ref 3.4–5.3)
SODIUM SERPL-SCNC: 138 MMOL/L (ref 133–144)

## 2022-05-05 PROCEDURE — 83036 HEMOGLOBIN GLYCOSYLATED A1C: CPT | Mod: ZL

## 2022-05-05 PROCEDURE — 80048 BASIC METABOLIC PNL TOTAL CA: CPT | Mod: ZL

## 2022-05-05 PROCEDURE — 36415 COLL VENOUS BLD VENIPUNCTURE: CPT | Mod: ZL

## 2022-05-06 NOTE — PROGRESS NOTES
"  Assessment & Plan     Type 2 diabetes mellitus without complication, without long-term current use of insulin (H)  Stable.  Slightly improved.  He was cheating and not moving much.  3 months to get moving and see what this does.  Nice review.  Offer more metformin but we will try activity and diet for this 3 months.  Labs ordered.      Essential hypertension  Stable.  No change.                 BMI:   Estimated body mass index is 29.56 kg/m  as calculated from the following:    Height as of 2/22/22: 1.791 m (5' 10.5\").    Weight as of this encounter: 94.8 kg (209 lb).           No follow-ups on file.    Bib Spencer MD  [facility]   Efrain is a 82 year old who presents for the following health issues     HPI     Diabetes Follow-up      How often are you checking your blood sugar? Not at all    What concerns do you have today about your diabetes? None     Do you have any of these symptoms? (Select all that apply)  No numbness or tingling in feet.  No redness, sores or blisters on feet.  No complaints of excessive thirst.  No reports of blurry vision.  No significant changes to weight.    Have you had a diabetic eye exam in the last 12 months? No        BP Readings from Last 2 Encounters:   05/09/22 132/78   02/22/22 132/70     Hemoglobin A1C POCT (%)   Date Value   09/17/2020 7.2 (H)   08/27/2018 7.1 (H)     Hemoglobin A1C (%)   Date Value   05/05/2022 7.6 (H)   02/18/2022 8.3 (H)     LDL Cholesterol Calculated (mg/dL)   Date Value   02/18/2022 112 (H)   09/17/2020 107 (H)   05/23/2017 89               Hypertension Follow-up      Do you check your blood pressure regularly outside of the clinic? No     Are you following a low salt diet? Yes    Are your blood pressures ever more than 140 on the top number (systolic) OR more   than 90 on the bottom number (diastolic), for example 140/90? NA          Review of Systems   Constitutional, HEENT, cardiovascular, pulmonary, gi and " gu systems are negative, except as otherwise noted.      Objective    /78   Pulse 68   Wt 94.8 kg (209 lb)   SpO2 98%   BMI 29.56 kg/m    Body mass index is 29.56 kg/m .  Physical Exam   GENERAL: healthy, alert and no distress  NECK: no adenopathy, no asymmetry, masses, or scars and thyroid normal to palpation  RESP: lungs clear to auscultation - no rales, rhonchi or wheezes  CV: regular rate and rhythm, normal S1 S2, no S3 or S4, no murmur, click or rub, no peripheral edema and peripheral pulses strong  ABDOMEN: soft, nontender, no hepatosplenomegaly, no masses and bowel sounds normal  MS: no gross musculoskeletal defects noted, no edema

## 2022-05-09 ENCOUNTER — OFFICE VISIT (OUTPATIENT)
Dept: FAMILY MEDICINE | Facility: OTHER | Age: 82
End: 2022-05-09
Attending: FAMILY MEDICINE
Payer: COMMERCIAL

## 2022-05-09 VITALS
WEIGHT: 209 LBS | OXYGEN SATURATION: 98 % | DIASTOLIC BLOOD PRESSURE: 78 MMHG | SYSTOLIC BLOOD PRESSURE: 132 MMHG | HEART RATE: 68 BPM | BODY MASS INDEX: 29.56 KG/M2

## 2022-05-09 DIAGNOSIS — E11.9 TYPE 2 DIABETES MELLITUS WITHOUT COMPLICATION, WITHOUT LONG-TERM CURRENT USE OF INSULIN (H): Primary | ICD-10-CM

## 2022-05-09 DIAGNOSIS — I10 ESSENTIAL HYPERTENSION: ICD-10-CM

## 2022-05-09 PROCEDURE — 99214 OFFICE O/P EST MOD 30 MIN: CPT | Performed by: FAMILY MEDICINE

## 2022-05-09 PROCEDURE — G0463 HOSPITAL OUTPT CLINIC VISIT: HCPCS

## 2022-05-09 ASSESSMENT — PAIN SCALES - GENERAL: PAINLEVEL: NO PAIN (0)

## 2022-05-09 NOTE — NURSING NOTE
"Chief Complaint   Patient presents with     Diabetes       Initial /78   Pulse 68   Wt 94.8 kg (209 lb)   SpO2 98%   BMI 29.56 kg/m   Estimated body mass index is 29.56 kg/m  as calculated from the following:    Height as of 2/22/22: 1.791 m (5' 10.5\").    Weight as of this encounter: 94.8 kg (209 lb).  Medication Reconciliation: complete  Dayana Reinoso LPN  "

## 2022-05-31 DIAGNOSIS — M19.90 OSTEOARTHRITIS, UNSPECIFIED OSTEOARTHRITIS TYPE, UNSPECIFIED SITE: ICD-10-CM

## 2022-08-03 NOTE — PROGRESS NOTES
"  Assessment & Plan     Type 2 diabetes mellitus without complication, without long-term current use of insulin (H)  Not controlled.  He gets side effects from metformin, more of a dizziness he thinks.  Hesitant to increase.  Stop that.  Start januvia 100.  Watch sugars.  3 month recheck.    - sitagliptin (JANUVIA) 100 MG tablet; Take 1 tablet (100 mg) by mouth daily  - Hemoglobin A1c; Future  - Basic metabolic panel; Future    Mixed dyslipidemia  Stable. Due in February.     Essential hypertension  Stable recheck.  No changes made.                 BMI:   Estimated body mass index is 29.71 kg/m  as calculated from the following:    Height as of 2/22/22: 1.791 m (5' 10.5\").    Weight as of this encounter: 95.3 kg (210 lb).           No follow-ups on file.    Bib Spencer MD  [facility]    Charline Quiroz is a 82 year old, presenting for the following health issues:  Diabetes      HPI     Diabetes Follow-up      How often are you checking your blood sugar? Not at all    What concerns do you have today about your diabetes? None     Do you have any of these symptoms? (Select all that apply)  No numbness or tingling in feet.  No redness, sores or blisters on feet.  No complaints of excessive thirst.  No reports of blurry vision.  No significant changes to weight.    Have you had a diabetic eye exam in the last 12 months? No-pt has appt in September at [place]        BP Readings from Last 2 Encounters:   08/17/22 (!) 148/82   05/09/22 132/78     Hemoglobin A1C (%)   Date Value   08/12/2022 8.0 (H)   05/05/2022 7.6 (H)   09/17/2020 7.2 (H)   08/27/2018 7.1 (H)     LDL Cholesterol Calculated (mg/dL)   Date Value   02/18/2022 112 (H)   09/17/2020 107 (H)   05/23/2017 89         Hypertension Follow-up      Do you check your blood pressure regularly outside of the clinic? No     Are you following a low salt diet? No    Are your blood pressures ever more than 140 on the top number (systolic) OR " more   than 90 on the bottom number (diastolic), for example 140/90? NA        Review of Systems   Constitutional, HEENT, cardiovascular, pulmonary, gi and gu systems are negative, except as otherwise noted.      Objective    BP (!) 148/82   Pulse 66   Wt 95.3 kg (210 lb)   SpO2 98%   BMI 29.71 kg/m    Body mass index is 29.71 kg/m .  Physical Exam   GENERAL: healthy, alert and no distress  NECK: no adenopathy, no asymmetry, masses, or scars and thyroid normal to palpation  RESP: lungs clear to auscultation - no rales, rhonchi or wheezes  CV: regular rate and rhythm, normal S1 S2, no S3 or S4, no murmur, click or rub, no peripheral edema and peripheral pulses strong  ABDOMEN: soft, nontender, no hepatosplenomegaly, no masses and bowel sounds normal  MS: no gross musculoskeletal defects noted, no edema                    .  ..

## 2022-08-12 ENCOUNTER — LAB (OUTPATIENT)
Dept: LAB | Facility: OTHER | Age: 82
End: 2022-08-12
Payer: MEDICARE

## 2022-08-12 DIAGNOSIS — E11.9 TYPE 2 DIABETES MELLITUS WITHOUT COMPLICATION, WITHOUT LONG-TERM CURRENT USE OF INSULIN (H): ICD-10-CM

## 2022-08-12 DIAGNOSIS — I10 ESSENTIAL HYPERTENSION: ICD-10-CM

## 2022-08-12 LAB
ANION GAP SERPL CALCULATED.3IONS-SCNC: 2 MMOL/L (ref 3–14)
BUN SERPL-MCNC: 22 MG/DL (ref 7–30)
CALCIUM SERPL-MCNC: 8.6 MG/DL (ref 8.5–10.1)
CHLORIDE BLD-SCNC: 107 MMOL/L (ref 94–109)
CO2 SERPL-SCNC: 27 MMOL/L (ref 20–32)
CREAT SERPL-MCNC: 1.12 MG/DL (ref 0.66–1.25)
EST. AVERAGE GLUCOSE BLD GHB EST-MCNC: 183 MG/DL
GFR SERPL CREATININE-BSD FRML MDRD: 66 ML/MIN/1.73M2
GLUCOSE BLD-MCNC: 140 MG/DL (ref 70–99)
HBA1C MFR BLD: 8 % (ref 0–5.6)
POTASSIUM BLD-SCNC: 4.3 MMOL/L (ref 3.4–5.3)
SODIUM SERPL-SCNC: 136 MMOL/L (ref 133–144)

## 2022-08-12 PROCEDURE — 36415 COLL VENOUS BLD VENIPUNCTURE: CPT | Mod: ZL

## 2022-08-12 PROCEDURE — 83036 HEMOGLOBIN GLYCOSYLATED A1C: CPT | Mod: ZL

## 2022-08-12 PROCEDURE — 82310 ASSAY OF CALCIUM: CPT | Mod: ZL

## 2022-08-17 ENCOUNTER — OFFICE VISIT (OUTPATIENT)
Dept: FAMILY MEDICINE | Facility: OTHER | Age: 82
End: 2022-08-17
Attending: FAMILY MEDICINE
Payer: COMMERCIAL

## 2022-08-17 VITALS
SYSTOLIC BLOOD PRESSURE: 136 MMHG | WEIGHT: 210 LBS | HEART RATE: 66 BPM | OXYGEN SATURATION: 98 % | DIASTOLIC BLOOD PRESSURE: 80 MMHG | BODY MASS INDEX: 29.71 KG/M2

## 2022-08-17 DIAGNOSIS — I10 ESSENTIAL HYPERTENSION: ICD-10-CM

## 2022-08-17 DIAGNOSIS — E78.2 MIXED DYSLIPIDEMIA: ICD-10-CM

## 2022-08-17 DIAGNOSIS — E11.9 TYPE 2 DIABETES MELLITUS WITHOUT COMPLICATION, WITHOUT LONG-TERM CURRENT USE OF INSULIN (H): Primary | ICD-10-CM

## 2022-08-17 PROCEDURE — 99213 OFFICE O/P EST LOW 20 MIN: CPT | Performed by: FAMILY MEDICINE

## 2022-08-17 PROCEDURE — G0463 HOSPITAL OUTPT CLINIC VISIT: HCPCS

## 2022-08-17 ASSESSMENT — PAIN SCALES - GENERAL: PAINLEVEL: NO PAIN (0)

## 2022-08-17 NOTE — NURSING NOTE
"Chief Complaint   Patient presents with     Diabetes       Initial BP (!) 148/82   Pulse 66   Wt 95.3 kg (210 lb)   SpO2 98%   BMI 29.71 kg/m   Estimated body mass index is 29.71 kg/m  as calculated from the following:    Height as of 2/22/22: 1.791 m (5' 10.5\").    Weight as of this encounter: 95.3 kg (210 lb).  Medication Reconciliation: complete  Dayana Reinoso LPN  "

## 2022-10-11 ENCOUNTER — TRANSFERRED RECORDS (OUTPATIENT)
Dept: HEALTH INFORMATION MANAGEMENT | Facility: CLINIC | Age: 82
End: 2022-10-11

## 2022-10-11 LAB — RETINOPATHY: NEGATIVE

## 2022-11-09 NOTE — PROGRESS NOTES
"  Assessment & Plan     Type 2 diabetes mellitus without complication, without long-term current use of insulin (H)  Nice review.  Improvement with the januvia, which is costly but he will pay it for now.  Update labs in 3 months, full fasting.  Reviewed diet/activity.  He had some extra halloween candy, which it's nice to see the improvements despite that.    - Basic metabolic panel; Future  - Hemoglobin A1c; Future  - Hemoglobin A1c; Future  - Comprehensive metabolic panel (BMP + Alb, Alk Phos, ALT, AST, Total. Bili, TP); Future  - Lipid Profile (Chol, Trig, HDL, LDL calc); Future  - TSH with free T4 reflex; Future  - Albumin Random Urine Quantitative with Creat Ratio; Future    Essential hypertension  Stable.  No change in his meds.  Continue to follow.    - Hemoglobin A1c; Future  - Comprehensive metabolic panel (BMP + Alb, Alk Phos, ALT, AST, Total. Bili, TP); Future  - Lipid Profile (Chol, Trig, HDL, LDL calc); Future  - TSH with free T4 reflex; Future  - Albumin Random Urine Quantitative with Creat Ratio; Future             BMI:   Estimated body mass index is 30.27 kg/m  as calculated from the following:    Height as of 2/22/22: 1.791 m (5' 10.5\").    Weight as of this encounter: 97.1 kg (214 lb).           No follow-ups on file.    Bib Spencer MD  [facility]    Charline Quiroz is a 82 year old, presenting for the following health issues:  Diabetes      HPI     Diabetes Follow-up      How often are you checking your blood sugar? Not at all    What concerns do you have today about your diabetes? None     Do you have any of these symptoms? (Select all that apply)  No numbness or tingling in feet.  No redness, sores or blisters on feet.  No complaints of excessive thirst.  No reports of blurry vision.  No significant changes to weight.      BP Readings from Last 2 Encounters:   11/21/22 134/80   08/17/22 136/80     Hemoglobin A1C (%)   Date Value   11/18/2022 7.5 (H)   08/12/2022 " 8.0 (H)   09/17/2020 7.2 (H)   08/27/2018 7.1 (H)     LDL Cholesterol Calculated (mg/dL)   Date Value   02/18/2022 112 (H)   09/17/2020 107 (H)   05/23/2017 89               Hypertension Follow-up      Do you check your blood pressure regularly outside of the clinic? No     Are you following a low salt diet? No    Are your blood pressures ever more than 140 on the top number (systolic) OR more   than 90 on the bottom number (diastolic), for example 140/90? NA          Review of Systems   Constitutional, HEENT, cardiovascular, pulmonary, gi and gu systems are negative, except as otherwise noted.      Objective    /80   Pulse 65   Wt 97.1 kg (214 lb)   SpO2 98%   BMI 30.27 kg/m    Body mass index is 30.27 kg/m .  Physical Exam   GENERAL: healthy, alert and no distress  NECK: no adenopathy, no asymmetry, masses, or scars and thyroid normal to palpation  RESP: lungs clear to auscultation - no rales, rhonchi or wheezes  CV: regular rate and rhythm, normal S1 S2, no S3 or S4, no murmur, click or rub, no peripheral edema and peripheral pulses strong  ABDOMEN: soft, nontender, no hepatosplenomegaly, no masses and bowel sounds normal  MS: no gross musculoskeletal defects noted, no edema

## 2022-11-15 ENCOUNTER — TELEPHONE (OUTPATIENT)
Dept: FAMILY MEDICINE | Facility: OTHER | Age: 82
End: 2022-11-15

## 2022-11-15 NOTE — TELEPHONE ENCOUNTER
Patient is calling stating he would like his labs ordered prior his appt Monday.He would like to due these in [place] this week so when he has his appt the labs are all ready done. Please call patient and let him know these orders can and/or not be ordered. [phone]

## 2022-11-18 ENCOUNTER — LAB (OUTPATIENT)
Dept: LAB | Facility: OTHER | Age: 82
End: 2022-11-18
Payer: MEDICARE

## 2022-11-18 DIAGNOSIS — E11.9 TYPE 2 DIABETES MELLITUS WITHOUT COMPLICATION, WITHOUT LONG-TERM CURRENT USE OF INSULIN (H): ICD-10-CM

## 2022-11-18 LAB
ANION GAP SERPL CALCULATED.3IONS-SCNC: 7 MMOL/L (ref 7–15)
BUN SERPL-MCNC: 19.9 MG/DL (ref 8–23)
CALCIUM SERPL-MCNC: 8.7 MG/DL (ref 8.8–10.2)
CHLORIDE SERPL-SCNC: 106 MMOL/L (ref 98–107)
CREAT SERPL-MCNC: 1.15 MG/DL (ref 0.67–1.17)
DEPRECATED HCO3 PLAS-SCNC: 25 MMOL/L (ref 22–29)
EST. AVERAGE GLUCOSE BLD GHB EST-MCNC: 169 MG/DL
GFR SERPL CREATININE-BSD FRML MDRD: 64 ML/MIN/1.73M2
GLUCOSE SERPL-MCNC: 135 MG/DL (ref 70–99)
HBA1C MFR BLD: 7.5 %
POTASSIUM SERPL-SCNC: 4.4 MMOL/L (ref 3.4–5.3)
SODIUM SERPL-SCNC: 138 MMOL/L (ref 136–145)

## 2022-11-18 PROCEDURE — 36415 COLL VENOUS BLD VENIPUNCTURE: CPT | Mod: ZL

## 2022-11-18 PROCEDURE — 80048 BASIC METABOLIC PNL TOTAL CA: CPT | Mod: ZL

## 2022-11-18 PROCEDURE — 83036 HEMOGLOBIN GLYCOSYLATED A1C: CPT | Mod: ZL

## 2022-11-21 ENCOUNTER — OFFICE VISIT (OUTPATIENT)
Dept: FAMILY MEDICINE | Facility: OTHER | Age: 82
End: 2022-11-21
Attending: FAMILY MEDICINE
Payer: COMMERCIAL

## 2022-11-21 VITALS
DIASTOLIC BLOOD PRESSURE: 80 MMHG | SYSTOLIC BLOOD PRESSURE: 134 MMHG | BODY MASS INDEX: 30.27 KG/M2 | OXYGEN SATURATION: 98 % | WEIGHT: 214 LBS | HEART RATE: 65 BPM

## 2022-11-21 DIAGNOSIS — E11.9 TYPE 2 DIABETES MELLITUS WITHOUT COMPLICATION, WITHOUT LONG-TERM CURRENT USE OF INSULIN (H): Primary | ICD-10-CM

## 2022-11-21 DIAGNOSIS — I10 ESSENTIAL HYPERTENSION: ICD-10-CM

## 2022-11-21 PROCEDURE — 99214 OFFICE O/P EST MOD 30 MIN: CPT | Performed by: FAMILY MEDICINE

## 2022-11-21 PROCEDURE — G0463 HOSPITAL OUTPT CLINIC VISIT: HCPCS

## 2022-11-21 ASSESSMENT — PAIN SCALES - GENERAL: PAINLEVEL: NO PAIN (0)

## 2023-02-13 NOTE — PROGRESS NOTES
Assessment & Plan     Type 2 diabetes mellitus without complication, without long-term current use of insulin (H)  Doing well.  Update foot exam stable.  Follow routine.  Stable a1c and bmp.   - PA FOOT EXAM NO CHARGE    Essential hypertension  Stable recheck.  Doing well.      TERESO (obstructive sleep apnea)  Reviewed.  He has an old machine which doesn't always work right.  He would like to try another.  It's 14 years old.  Talked about this and treating tereso and [facility] to [facility] for Sarath to discuss options with him.    - Miscellaneous Order for DME - ONLY FOR DME                 No follow-ups on file.    Bib Spencer MD  [facility]    Charline Quiroz is a 82 year old, presenting for the following health issues:  Diabetes      HPI     Diabetes Follow-up      How often are you checking your blood sugar? Not at all    What concerns do you have today about your diabetes? None     Do you have any of these symptoms? (Select all that apply)  No numbness or tingling in feet.  No redness, sores or blisters on feet.  No complaints of excessive thirst.  No reports of blurry vision.  No significant changes to weight.              Hyperlipidemia Follow-Up      Are you regularly taking any medication or supplement to lower your cholesterol?   No    Are you having muscle aches or other side effects that you think could be caused by your cholesterol lowering medication?  No    Hypertension Follow-up      Do you check your blood pressure regularly outside of the clinic? No     Are you following a low salt diet? Yes    Are your blood pressures ever more than 140 on the top number (systolic) OR more   than 90 on the bottom number (diastolic), for example 140/90? NA    BP Readings from Last 2 Encounters:   02/23/23 136/74   11/21/22 134/80     Hemoglobin A1C (%)   Date Value   02/16/2023 7.5 (H)   11/18/2022 7.5 (H)   09/17/2020 7.2 (H)   08/27/2018 7.1 (H)     LDL Cholesterol Calculated (mg/dL)    Date Value   02/16/2023 101 (H)   02/18/2022 112 (H)   09/17/2020 107 (H)   05/23/2017 89       Diabetic foot exam   1. foot deformity   Yes  2. Current or previous foot ulceration     No  3. Current or previous pre-ulcerative calluses     Yes  4. previous partial amputation of one or both feet or complete amputation of one foot     No  5. peripheral neuropathy with evidence of callus formation     No  6. poor circulation     No  Approval given for 3 pairs of diabetic shoes and inserts if patient desires.        Review of Systems   Constitutional, HEENT, cardiovascular, pulmonary, gi and gu systems are negative, except as otherwise noted.      Objective    /74   Pulse 69   Temp 97.3  F (36.3  C) (Tympanic)   Wt 93 kg (205 lb)   SpO2 96%   BMI 29.00 kg/m    Body mass index is 29 kg/m .  Physical Exam   GENERAL: healthy, alert and no distress  NECK: no adenopathy, no asymmetry, masses, or scars and thyroid normal to palpation  RESP: lungs clear to auscultation - no rales, rhonchi or wheezes  CV: regular rate and rhythm, normal S1 S2, no S3 or S4, no murmur, click or rub, no peripheral edema and peripheral pulses strong  ABDOMEN: soft, nontender, no hepatosplenomegaly, no masses and bowel sounds normal  MS: no gross musculoskeletal defects noted, no edema    Labs reviewed.      Script sent for cpap and supplies.

## 2023-02-16 ENCOUNTER — LAB (OUTPATIENT)
Dept: LAB | Facility: OTHER | Age: 83
End: 2023-02-16
Payer: MEDICARE

## 2023-02-16 DIAGNOSIS — E11.9 TYPE 2 DIABETES MELLITUS WITHOUT COMPLICATION, WITHOUT LONG-TERM CURRENT USE OF INSULIN (H): ICD-10-CM

## 2023-02-16 DIAGNOSIS — I10 ESSENTIAL HYPERTENSION: ICD-10-CM

## 2023-02-16 LAB
ALBUMIN SERPL BCG-MCNC: 3.3 G/DL (ref 3.5–5.2)
ALP SERPL-CCNC: 95 U/L (ref 40–129)
ALT SERPL W P-5'-P-CCNC: 15 U/L (ref 10–50)
ANION GAP SERPL CALCULATED.3IONS-SCNC: 6 MMOL/L (ref 7–15)
AST SERPL W P-5'-P-CCNC: 18 U/L (ref 10–50)
BILIRUB SERPL-MCNC: 1.2 MG/DL
BUN SERPL-MCNC: 19.8 MG/DL (ref 8–23)
CALCIUM SERPL-MCNC: 8.9 MG/DL (ref 8.8–10.2)
CHLORIDE SERPL-SCNC: 106 MMOL/L (ref 98–107)
CHOLEST SERPL-MCNC: 162 MG/DL
CREAT SERPL-MCNC: 1.14 MG/DL (ref 0.67–1.17)
CREAT UR-MCNC: 154.9 MG/DL
DEPRECATED HCO3 PLAS-SCNC: 27 MMOL/L (ref 22–29)
EST. AVERAGE GLUCOSE BLD GHB EST-MCNC: 169 MG/DL
GFR SERPL CREATININE-BSD FRML MDRD: 64 ML/MIN/1.73M2
GLUCOSE SERPL-MCNC: 144 MG/DL (ref 70–99)
HBA1C MFR BLD: 7.5 %
HDLC SERPL-MCNC: 49 MG/DL
LDLC SERPL CALC-MCNC: 101 MG/DL
MICROALBUMIN UR-MCNC: 17.2 MG/L
MICROALBUMIN/CREAT UR: 11.1 MG/G CR (ref 0–17)
NONHDLC SERPL-MCNC: 113 MG/DL
POTASSIUM SERPL-SCNC: 4.2 MMOL/L (ref 3.4–5.3)
PROT SERPL-MCNC: 7.3 G/DL (ref 6.4–8.3)
SODIUM SERPL-SCNC: 139 MMOL/L (ref 136–145)
TRIGL SERPL-MCNC: 59 MG/DL
TSH SERPL DL<=0.005 MIU/L-ACNC: 1.53 UIU/ML (ref 0.3–4.2)

## 2023-02-16 PROCEDURE — 82374 ASSAY BLOOD CARBON DIOXIDE: CPT | Mod: ZL

## 2023-02-16 PROCEDURE — 82570 ASSAY OF URINE CREATININE: CPT | Mod: ZL

## 2023-02-16 PROCEDURE — 80061 LIPID PANEL: CPT | Mod: ZL

## 2023-02-16 PROCEDURE — 82435 ASSAY OF BLOOD CHLORIDE: CPT | Mod: ZL

## 2023-02-16 PROCEDURE — 36415 COLL VENOUS BLD VENIPUNCTURE: CPT | Mod: ZL

## 2023-02-16 PROCEDURE — 83036 HEMOGLOBIN GLYCOSYLATED A1C: CPT | Mod: ZL

## 2023-02-16 PROCEDURE — 84443 ASSAY THYROID STIM HORMONE: CPT | Mod: ZL

## 2023-02-23 ENCOUNTER — OFFICE VISIT (OUTPATIENT)
Dept: FAMILY MEDICINE | Facility: OTHER | Age: 83
End: 2023-02-23
Attending: FAMILY MEDICINE
Payer: MEDICARE

## 2023-02-23 VITALS
DIASTOLIC BLOOD PRESSURE: 74 MMHG | HEART RATE: 69 BPM | OXYGEN SATURATION: 96 % | WEIGHT: 205 LBS | BODY MASS INDEX: 29 KG/M2 | TEMPERATURE: 97.3 F | SYSTOLIC BLOOD PRESSURE: 136 MMHG

## 2023-02-23 DIAGNOSIS — Z23 NEED FOR VACCINATION: ICD-10-CM

## 2023-02-23 DIAGNOSIS — I10 ESSENTIAL HYPERTENSION: ICD-10-CM

## 2023-02-23 DIAGNOSIS — E11.9 TYPE 2 DIABETES MELLITUS WITHOUT COMPLICATION, WITHOUT LONG-TERM CURRENT USE OF INSULIN (H): Primary | ICD-10-CM

## 2023-02-23 DIAGNOSIS — G47.33 OSA (OBSTRUCTIVE SLEEP APNEA): ICD-10-CM

## 2023-02-23 PROCEDURE — 99213 OFFICE O/P EST LOW 20 MIN: CPT | Performed by: FAMILY MEDICINE

## 2023-02-23 PROCEDURE — 90677 PCV20 VACCINE IM: CPT

## 2023-02-23 PROCEDURE — G0463 HOSPITAL OUTPT CLINIC VISIT: HCPCS

## 2023-02-23 ASSESSMENT — PAIN SCALES - GENERAL: PAINLEVEL: NO PAIN (0)

## 2023-03-13 ENCOUNTER — HOSPITAL ENCOUNTER (EMERGENCY)
Facility: HOSPITAL | Age: 83
Discharge: HOME | End: 2023-03-13
Attending: NURSE PRACTITIONER | Admitting: NURSE PRACTITIONER
Payer: MEDICARE

## 2023-03-13 ENCOUNTER — APPOINTMENT (OUTPATIENT)
Dept: CT IMAGING | Facility: HOSPITAL | Age: 83
End: 2023-03-13
Attending: NURSE PRACTITIONER
Payer: MEDICARE

## 2023-03-13 ENCOUNTER — APPOINTMENT (OUTPATIENT)
Dept: GENERAL RADIOLOGY | Facility: HOSPITAL | Age: 83
End: 2023-03-13
Attending: NURSE PRACTITIONER
Payer: MEDICARE

## 2023-03-13 VITALS
RESPIRATION RATE: 16 BRPM | DIASTOLIC BLOOD PRESSURE: 88 MMHG | HEART RATE: 56 BPM | SYSTOLIC BLOOD PRESSURE: 136 MMHG | TEMPERATURE: 97.9 F | OXYGEN SATURATION: 98 %
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
## 2023-03-13 DIAGNOSIS — M54.9 BACK PAIN: Primary | ICD-10-CM

## 2023-03-13 DIAGNOSIS — M54.6 THORACIC BACK PAIN, UNSPECIFIED BACK PAIN LATERALITY, UNSPECIFIED CHRONICITY: ICD-10-CM

## 2023-03-13 PROCEDURE — G1010 CDSM STANSON: HCPCS

## 2023-03-13 PROCEDURE — 99214 OFFICE O/P EST MOD 30 MIN: CPT | Performed by: NURSE PRACTITIONER

## 2023-03-13 PROCEDURE — 72100 X-RAY EXAM L-S SPINE 2/3 VWS: CPT

## 2023-03-13 PROCEDURE — 71250 CT THORAX DX C-: CPT | Mod: MG

## 2023-03-13 PROCEDURE — G0463 HOSPITAL OUTPT CLINIC VISIT: HCPCS | Mod: 25

## 2023-03-13 ASSESSMENT — ENCOUNTER SYMPTOMS
DYSURIA: 0
ABDOMINAL PAIN: 0
PSYCHIATRIC NEGATIVE: 1
HEADACHES: 0
EYE PAIN: 0
NECK PAIN: 0
NAUSEA: 0
FLANK PAIN: 0
NECK STIFFNESS: 0
DIARRHEA: 0
PHOTOPHOBIA: 0
DIZZINESS: 0
VOMITING: 0
CHILLS: 0
FEVER: 0
BLOOD IN STOOL: 0
BACK PAIN: 1
HEMATURIA: 0
COUGH: 0
SHORTNESS OF BREATH: 0
MYALGIAS: 1
TROUBLE SWALLOWING: 0
NUMBNESS: 0

## 2023-03-13 ASSESSMENT — ACTIVITIES OF DAILY LIVING (ADL)
ADLS_ACUITY_SCORE: 35
ADLS_ACUITY_SCORE: 35

## 2023-03-13 NOTE — DISCHARGE INSTRUCTIONS
Tylenol for pain  May attempt over-the-counter Biofreeze, IcyHot or Lidoderm patches as needed for pain  Follow-up with primary care provider or return to urgent care/ED with any worsening in condition or additional concerns.

## 2023-03-13 NOTE — ED TRIAGE NOTES
Patient presents to urgent care with his daughter for a slipped while shoveling yesterday. Patient states he hit his small of his back on the step, denies hitting his head. In 2017, he broke his back so he wants to make sure he hasn't broke anything again.

## 2023-03-13 NOTE — ED PROVIDER NOTES
History     Chief Complaint   Patient presents with     Back Pain     HPI  Efrain Weiner is a 83 year old male who presents to urgent care today (ambulatory) accompanied by daughter with complaints of thoracic back pain after a fall yesterday when patient was shoveling and hit his back on the step.  Denies hitting head, LOC or any other injuries.  Not currently on any blood thinners.  No open skin wounds.  Denies any neck pain or stiffness.  Denies any bowel or bladder dysfunction.  Denies any saddle anesthesia.  Denies any numbness or weakness to bilateral lower extremities not attributed to pain.  Denies any fever, chills, nausea, vomiting, diarrhea, shortness of breath or chest pain.  Denies any headache, vision changes, tinnitus, ear drainage, nosebleeds, hemoptyosis, hematuria, BRBPR.  Denies any dysuria or frequency.  Denies any abdominal pain.  Taking APAP for pain which is helpful.  No other concerns.    Allergies:  Allergies   Allergen Reactions     Nkda [No Known Drug Allergies]        Problem List:    Patient Active Problem List    Diagnosis Date Noted     Closed wedge compression fracture of fourth thoracic vertebra (H) 05/26/2017     Priority: Medium     Formatting of this note might be different from the original.  And T5       Essential hypertension 05/26/2017     Priority: Medium     Fall from ladder, initial encounter 05/26/2017     Priority: Medium     Scalp laceration, initial encounter 05/26/2017     Priority: Medium     S/P total knee replacement, left 06/07/2016     Priority: Medium     S/P total knee replacement, right 06/07/2016     Priority: Medium     H/O nephrolithiasis 06/01/2016     Priority: Medium     Right knee DJD 07/18/2014     Priority: Medium     Lower urinary tract infectious disease 07/10/2014     Priority: Medium     Formatting of this note might be different from the original.  IMO Update       S/P total knee arthroplasty 07/10/2014     Priority: Medium     Status post THR  (total hip replacement) 04/03/2014     Priority: Medium     Mixed dyslipidemia 04/03/2014     Priority: Medium     Dyslipidemia      Priority: Medium     TERESO (obstructive sleep apnea)      Priority: Medium     Prostate cancer (H) 04/26/2013     Priority: Medium     08/2011  S/P DaVinci prostatectomy       Actinic keratosis 08/06/2012     Priority: Medium     Alopecia areata 08/06/2012     Priority: Medium     Inflamed seborrheic keratosis 08/06/2012     Priority: Medium     Osteoarthrosis, multiple sites 03/25/2011     Priority: Medium     ED (erectile dysfunction) 03/25/2011     Priority: Medium     BPH (benign prostatic hyerplasia) 04/08/2005     Priority: Medium     Type 2 diabetes mellitus without complication (H) 09/29/2004     Priority: Medium        Past Medical History:    Past Medical History:   Diagnosis Date     Allergic rhinitis, seasonal 3/25/2011     BPH 4/8/2005     Cancer, Prostate 3/25/2011     Chronic prostatitis 9/5/2007     Diabetes Mellitus, Type 2 9/29/2004     Dyslipidemia 3/25/2011     Eczema 3/25/2011     Erectile Dysfunction 3/25/2011     Mixed hyperlipidemia      Nephrolithiasis 8/24/2011     TERESO on CPAP      Osteoarthritis, Generalized 3/25/2011       Past Surgical History:    Past Surgical History:   Procedure Laterality Date     BIOPSY PROSTATE TRANSRECTAL       cataract extraction      right     COLONOSCOPY      2006     COLONOSCOPY  4-     excision of acrochordon       excision of peritonsillar abscess       kidney stones blasted  2011     laparoscopic bilateral inguinal hernia repair  8/2011    Bilateral inguinal hernia     prostatectomy  8/2011    Adenocarcinoma of the prostate     shoulder arthroscopy with Pam procedure      right     VASECTOMY         Family History:    Family History   Problem Relation Age of Onset     Prostate Cancer Father         cause of death     Diabetes Father      Hypertension Father      Other - See Comments Father         renal disease      Cerebrovascular Disease Mother         CVA     Diabetes Paternal Grandfather      Glaucoma Brother      Asthma No family hx of        Social History:  Marital Status:   [2]  Social History     Tobacco Use     Smoking status: Former     Types: Cigarettes     Quit date: 1969     Years since quittin.1     Smokeless tobacco: Never     Tobacco comments:     no passive smoke exposure   Substance Use Topics     Alcohol use: Yes     Comment: socially     Drug use: No        Medications:    Cholecalciferol (VITAMIN D) 2000 UNITS tablet  DiphenhydrAMINE HCl, Sleep, 25 MG CAPS  ibuprofen (ADVIL,MOTRIN) 200 MG tablet  nabumetone (RELAFEN) 750 MG tablet  PSEUDOEPHEDRINE HCL 60 MG OR TABS  sitagliptin (JANUVIA) 100 MG tablet      Review of Systems   Constitutional: Negative for chills and fever.   HENT: Negative for ear discharge, nosebleeds, tinnitus and trouble swallowing.    Eyes: Negative for photophobia, pain and visual disturbance.   Respiratory: Negative for cough and shortness of breath.    Cardiovascular: Negative for chest pain.   Gastrointestinal: Negative for abdominal pain, blood in stool, diarrhea, nausea and vomiting.   Genitourinary: Negative for decreased urine volume, dysuria, flank pain and hematuria.   Musculoskeletal: Positive for back pain and myalgias. Negative for gait problem, neck pain and neck stiffness.   Skin: Negative.    Neurological: Negative for dizziness, numbness and headaches.   Psychiatric/Behavioral: Negative.      Physical Exam   BP: 174/100  Pulse: 56  Temp: 97.9  F (36.6  C)  Resp: 16  SpO2: 98 %  Recheck  BP: 136/88    Physical Exam  Vitals and nursing note reviewed.   Constitutional:       General: He is not in acute distress.     Appearance: Normal appearance. He is not ill-appearing or toxic-appearing.   HENT:      Head: Normocephalic.      Right Ear: Tympanic membrane, ear canal and external ear normal.      Left Ear: Tympanic membrane, ear canal and external ear  normal.      Nose: Nose normal.      Mouth/Throat:      Mouth: Mucous membranes are moist.      Pharynx: Oropharynx is clear.   Eyes:      Extraocular Movements: Extraocular movements intact.      Conjunctiva/sclera: Conjunctivae normal.      Pupils: Pupils are equal, round, and reactive to light.   Cardiovascular:      Rate and Rhythm: Normal rate and regular rhythm.      Pulses: Normal pulses.      Heart sounds: Normal heart sounds.   Pulmonary:      Effort: Pulmonary effort is normal.      Breath sounds: Normal breath sounds.   Abdominal:      General: Bowel sounds are normal.      Palpations: Abdomen is soft.      Tenderness: There is no abdominal tenderness. There is no right CVA tenderness or left CVA tenderness.   Musculoskeletal:      Cervical back: Normal, normal range of motion and neck supple.      Thoracic back: Tenderness present. No swelling, deformity, signs of trauma, lacerations or spasms. Normal range of motion.      Lumbar back: Normal.   Lymphadenopathy:      Cervical: No cervical adenopathy.   Skin:     General: Skin is warm and dry.      Capillary Refill: Capillary refill takes less than 2 seconds.   Neurological:      General: No focal deficit present.      Mental Status: He is alert.   Psychiatric:         Mood and Affect: Mood normal.       ED Course     Results for orders placed or performed during the hospital encounter of 03/13/23 (from the past 24 hour(s))   Lumbar spine XR, 2-3 views    Narrative    Exam: XR LUMBAR SPINE 2/3 VIEWS     History:Male, age 83 years, generalized pain from a fall and hitting  his low back on the steps    Comparison:  No relevant prior imaging.    Technique: Three views are submitted.    Findings: Bones are normally mineralized. No evidence of acute or  subacute fracture.  Moderate to severe degenerative changes with  slight scoliosis, convex left with the apex at L3. Near bone-on-bone  articulation of the L3-4 endplates with grade 1 retrolisthesis of  L3  relative to L4..           Impression    Impression:  No evidence of acute or subacute bony abnormality.     Moderate to severe multilevel degenerative change.    Generalized osteopenia.    Limited evaluation suggests a 3.8 cm fusiform abdominal aortic  aneurysm.    SHANKAR BRICE MD         SYSTEM ID:  [id number]   Chest CT w/o contrast    Narrative    CT CHEST W/O CONTRAST  3/13/2023 10:42 AM    CLINICAL HISTORY: Male, age 83 years,  fall;    Comparison:  CT scan abdomen and pelvis 7/4/2019    TECHNIQUE:  CT was performed of the chest  without contrast.   Axial; sagittal and coronal MIP images were reviewed..     FINDINGS:  Chest CT:    Lungs demonstrate areas of atelectasis with a predominance in the  lung bases. No evidence of pneumothorax.    The heart and great vessels demonstrate dense vascular calcifications  without distinct evidence of acute abnormality. No evidence of  pericardial effusion.    Mild generalized enlargement of the thyroid gland is again seen with  low dense foci, similar dating back to 5/25/2017 and CT scan of the  thoracic spine. Sternum is intact. There is no evidence of an apparent  rib fracture. Degenerative changes are again seen throughout the  thoracic cervical/lumbar spine as well as within the shoulders.      Mild fat stranding is seen in the midline and para midline regions in  the lower thoracic region.    Visualized portions of the upper abdomen again demonstrate radiodense  gallstones. 2 mm calculus in the upper pole the right kidney as well  as a 6 mm dense and a 5 mm low dense lesion arising from the dorsal  and ventral aspect of the right kidney are also unchanged, likely  representing cortical cysts.      Impression    IMPRESSION:   No evidence of acute abnormality. More specifically, no distinct  evidence of acute fracture or pneumothorax.    Chronic changes throughout the chest are similar in appearance dating  back to previous thoracic spine CT.    Changes in the  upper abdomen are also similar appearance with  radiodense gallstones and right-sided renal stone and stent/low dense  right renal lesions suggesting fluid-filled and calcium containing  cortical cysts.    This facility minimizes radiation dose by adjusting the mA and/or kV  according to each patient size.      This CT scan was performed using one or more the following dose  reduction techniques:    -Automated exposure control,  -Adjustment of the mA and/or kV according to patient's size, and/or,  -Use of iterative reconstruction technique.    SHANKAR BRICE MD         SYSTEM ID:  [id number]   CT Thoracic Spine w/o Contrast    Narrative    CT THORACIC SPINE W/O CONTRAST, 3/13/2023 10:42 AM    History: Male, age 83 years; fall, back pain/rib pain    Comparison: CT scan thoracic spine 5/25/2017    TECHNIQUE: CT was performed of the thoracic spine. Axial; sagittal and  coronal MIP images were reviewed..    FINDINGS: The  thoracic spine demonstrates accentuation of normal  kyphosis and slight scoliosis, convex right with the apex at T9,  similar when compared to the prior study.     There is no evidence of an acute fracture. Fracture deformity seen  previously involving the T4 and T5 vertebral bodies have healed.     Ankylosis of a number of the mid thoracic vertebral bodies is again  seen with large ventral bridging osteophytes extending caudally from  T2 to T10.     Paraspinous soft tissues demonstrates mild generalized edema of the  subcutaneous fat without acute abnormality. An old ununited spinous  process fracture is again seen at C7.      Impression    IMPRESSION:   No evidence of acute or subacute bony abnormality.     Moderate to severe degenerative changes again seen ankylosis involving  a number the mid/lower thoracic vertebral bodies.    This facility minimizes radiation dose by adjusting the mA and/or kV  according to each patient size.      This CT scan was performed using one or more the following  dose  reduction techniques:    -Automated exposure control,  -Adjustment of the mA and/or kV according to patient's size, and/or,  -Use of iterative reconstruction technique.    SHANKAR BRICE MD         SYSTEM ID:  [id number]       Medications - No data to display    Assessments & Plan (with Medical Decision Making)     I have reviewed the nursing notes.    I have reviewed the findings, diagnosis, plan and need for follow up with the patient.  (M54.9) Back pain  (primary encounter diagnosis  (W19.XXXA) Fall, initial encounter  Plan:   Patient ambulatory with a nontoxic appearance.  Patient fell yesterday hitting mid back on a step.  Denies hitting head, LOC or any other injuries.  Patient able to stand from a seated position and ambulate without difficulty.  Denies any fever, chills, nausea, vomiting, diarrhea, shortness of breath or chest pain.  Patient has mid back tenderness upon palpation.  No skin abnormalities.  Full range of motion of neck.  Mild decrease in range of motion to back.  Denies any bowel or bladder dysfunction.  Denies any saddle anesthesia.  Denies any numbness or weakness to bilateral lower extremities not attributed to pain.  Patient states he has been taking Tylenol which has been helpful.  Patient had lumbar x-ray completed prior to seeing patient which showed no evidence of acute or subacute bony abnormality.  Mild to severe multifocal degenerative change.  Given that patient fell directly onto a step hitting mid back and his age, went ahead with a CT thoracic spine and CT chest without contrast.  CT chest without contrast shows no evidence of acute abnormality.  No acute fracture or pneumothorax.  CT thoracic spine shows no evidence of acute or subacute bony abnormality.  Moderate to severe degenerative changes.  Patient declines any pain medication while in urgent care, states Tylenol has been working well just wanted to make sure it was not anything more than just back pain.  Patient  states he will go home and take a sauna and that will help loosen up his muscles.  Patient declines Lidoderm patch.  Patient to follow-up with primary care provider or return to urgent care/ED with any worsening in condition or additional concerns.  Patient is in agreement with treatment plan.    Discharge Medication List as of 3/13/2023 11:20 AM        Final diagnoses:   Back pain   Fall, initial encounter     3/13/2023   [facility]     Regla Mcneal NP  03/13/23 [id number]

## 2023-03-13 NOTE — ED TRIAGE NOTES
Patient slipped when shoveling yesterday.hit his lower right back on a step. Wants to make sure he is ok on the inside. Denies hitting his head

## 2023-05-22 ENCOUNTER — LAB (OUTPATIENT)
Dept: LAB | Facility: OTHER | Age: 83
End: 2023-05-22
Payer: MEDICARE

## 2023-05-22 DIAGNOSIS — E11.9 TYPE 2 DIABETES MELLITUS WITHOUT COMPLICATION, WITHOUT LONG-TERM CURRENT USE OF INSULIN (H): ICD-10-CM

## 2023-05-22 LAB
ANION GAP SERPL CALCULATED.3IONS-SCNC: 7 MMOL/L (ref 7–15)
BUN SERPL-MCNC: 22.3 MG/DL (ref 8–23)
CALCIUM SERPL-MCNC: 8.9 MG/DL (ref 8.8–10.2)
CHLORIDE SERPL-SCNC: 107 MMOL/L (ref 98–107)
CREAT SERPL-MCNC: 1.1 MG/DL (ref 0.67–1.17)
DEPRECATED HCO3 PLAS-SCNC: 25 MMOL/L (ref 22–29)
EST. AVERAGE GLUCOSE BLD GHB EST-MCNC: 174 MG/DL
GFR SERPL CREATININE-BSD FRML MDRD: 67 ML/MIN/1.73M2
GLUCOSE SERPL-MCNC: 123 MG/DL (ref 70–99)
HBA1C MFR BLD: 7.7 %
POTASSIUM SERPL-SCNC: 4.2 MMOL/L (ref 3.4–5.3)
SODIUM SERPL-SCNC: 139 MMOL/L (ref 136–145)

## 2023-05-22 PROCEDURE — 36415 COLL VENOUS BLD VENIPUNCTURE: CPT | Mod: ZL

## 2023-05-22 PROCEDURE — 80048 BASIC METABOLIC PNL TOTAL CA: CPT | Mod: ZL

## 2023-05-22 PROCEDURE — 83036 HEMOGLOBIN GLYCOSYLATED A1C: CPT | Mod: ZL

## 2023-05-23 NOTE — PROGRESS NOTES
Assessment & Plan     Type 2 diabetes mellitus without complication, without long-term current use of insulin (H)  Doing well.  Update labs are stable.  Has been borderline and consistent.  Keep on the monitoring and diet.  He is working on his daughter's house right now and very active at this time.        Essential hypertension  Stable.  Doing great.                     No follow-ups on file.    Bib Spencer MD  [facility] [place]    Charline Quiroz is a 83 year old, presenting for the following health issues:  Diabetes         View : No data to display.              HPI     Diabetes Follow-up      How often are you checking your blood sugar? Not at all    What concerns do you have today about your diabetes? None     Do you have any of these symptoms? (Select all that apply)  No numbness or tingling in feet.  No redness, sores or blisters on feet.  No complaints of excessive thirst.  No reports of blurry vision.  No significant changes to weight.      BP Readings from Last 2 Encounters:   05/24/23 134/76   03/13/23 136/88     Hemoglobin A1C (%)   Date Value   05/22/2023 7.7 (H)   02/16/2023 7.5 (H)   09/17/2020 7.2 (H)   08/27/2018 7.1 (H)     LDL Cholesterol Calculated (mg/dL)   Date Value   02/16/2023 101 (H)   02/18/2022 112 (H)   09/17/2020 107 (H)   05/23/2017 89           Hypertension Follow-up      Do you check your blood pressure regularly outside of the clinic? No     Are you following a low salt diet? Yes    Are your blood pressures ever more than 140 on the top number (systolic) OR more   than 90 on the bottom number (diastolic), for example 140/90? NA            Review of Systems   Constitutional, HEENT, cardiovascular, pulmonary, gi and gu systems are negative, except as otherwise noted.      Objective    /76   Pulse 51   Wt 93 kg (205 lb)   SpO2 98%   BMI 29.00 kg/m    Body mass index is 29 kg/m .  Physical Exam   GENERAL: healthy, alert and no distress  NECK: no  adenopathy, no asymmetry, masses, or scars and thyroid normal to palpation  RESP: lungs clear to auscultation - no rales, rhonchi or wheezes  CV: regular rate and rhythm, normal S1 S2, no S3 or S4, no murmur, click or rub, no peripheral edema and peripheral pulses strong  ABDOMEN: soft, nontender, no hepatosplenomegaly, no masses and bowel sounds normal  MS: no gross musculoskeletal defects noted, no edema

## 2023-05-24 ENCOUNTER — OFFICE VISIT (OUTPATIENT)
Dept: FAMILY MEDICINE | Facility: OTHER | Age: 83
End: 2023-05-24
Attending: FAMILY MEDICINE
Payer: COMMERCIAL

## 2023-05-24 VITALS
WEIGHT: 205 LBS | HEART RATE: 51 BPM | DIASTOLIC BLOOD PRESSURE: 76 MMHG | BODY MASS INDEX: 29 KG/M2 | SYSTOLIC BLOOD PRESSURE: 134 MMHG | OXYGEN SATURATION: 98 %

## 2023-05-24 DIAGNOSIS — E11.9 TYPE 2 DIABETES MELLITUS WITHOUT COMPLICATION, WITHOUT LONG-TERM CURRENT USE OF INSULIN (H): Primary | ICD-10-CM

## 2023-05-24 DIAGNOSIS — I10 ESSENTIAL HYPERTENSION: ICD-10-CM

## 2023-05-24 PROCEDURE — 99214 OFFICE O/P EST MOD 30 MIN: CPT | Performed by: FAMILY MEDICINE

## 2023-05-24 PROCEDURE — G0463 HOSPITAL OUTPT CLINIC VISIT: HCPCS

## 2023-05-24 ASSESSMENT — PAIN SCALES - GENERAL: PAINLEVEL: NO PAIN (0)

## 2023-07-03 ENCOUNTER — APPOINTMENT (OUTPATIENT)
Dept: CT IMAGING | Facility: HOSPITAL | Age: 83
End: 2023-07-03
Payer: MEDICARE

## 2023-07-03 ENCOUNTER — HOSPITAL ENCOUNTER (EMERGENCY)
Facility: HOSPITAL | Age: 83
Discharge: HOME | End: 2023-07-03
Payer: MEDICARE

## 2023-07-03 VITALS
TEMPERATURE: 98.4 F | SYSTOLIC BLOOD PRESSURE: 174 MMHG | WEIGHT: 202.38 LBS | HEIGHT: 71 IN | RESPIRATION RATE: 16 BRPM | DIASTOLIC BLOOD PRESSURE: 96 MMHG | HEART RATE: 74 BPM | BODY MASS INDEX: 28.33 KG/M2 | OXYGEN SATURATION: 97 %
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
## 2023-07-03 DIAGNOSIS — S09.90XA CLOSED HEAD INJURY, INITIAL ENCOUNTER: ICD-10-CM

## 2023-07-03 PROCEDURE — G1010 CDSM STANSON: HCPCS

## 2023-07-03 PROCEDURE — 99213 OFFICE O/P EST LOW 20 MIN: CPT

## 2023-07-03 PROCEDURE — G0463 HOSPITAL OUTPT CLINIC VISIT: HCPCS | Mod: 25

## 2023-07-03 ASSESSMENT — ENCOUNTER SYMPTOMS
HEADACHES: 0
COUGH: 0
SHORTNESS OF BREATH: 0
BACK PAIN: 0
LIGHT-HEADEDNESS: 0
NECK STIFFNESS: 0
WOUND: 1
DIARRHEA: 0
SORE THROAT: 0
DIZZINESS: 0
VOMITING: 0
NAUSEA: 0
NECK PAIN: 0
ABDOMINAL PAIN: 0
ACTIVITY CHANGE: 0
FACIAL ASYMMETRY: 0
CHILLS: 0
NUMBNESS: 0
APPETITE CHANGE: 0
FEVER: 0

## 2023-07-03 ASSESSMENT — ACTIVITIES OF DAILY LIVING (ADL): ADLS_ACUITY_SCORE: 35

## 2023-07-03 NOTE — DISCHARGE INSTRUCTIONS
Please return with any dizziness, chest pain, headaches, fevers, or other concerns.     Keep wounds clean and dry. Caution in the sun    Tylenol and ibuprofen as needed for pain.

## 2023-07-03 NOTE — ED TRIAGE NOTES
Pt presents with c/o an abrasion on his head after a fall yesterday. Reports he is unsure how he fell but just did. Reports that he hit his head on a drive way. Denies LOC, headache, neck pain, double or blurry vision. Pt was drinking when it happened. Denies use of blood thinners or hx of clotting disorders.

## 2023-07-03 NOTE — ED PROVIDER NOTES
History     Chief Complaint   Patient presents with     Head Injury     HPI  Efrain Weiner is a 83 year old male who presents to the urgent care with complaints of an abrasion to his left forehead after tripping and falling last night. He notes that he had 2 drinks and lost his balance. Denies loss of consciousness. He denies chest pain, dizziness, and shortness of breath at time of fall. He does not take blood thinners. Denies fevers, chills, headaches, dizziness, n/v, chest pain, shortness of breath, and neck pain. No OTC medications taken. He is alert and oriented.    Allergies:  Allergies   Allergen Reactions     Nkda [No Known Drug Allergy]        Problem List:    Patient Active Problem List    Diagnosis Date Noted     Closed wedge compression fracture of fourth thoracic vertebra (H) 05/26/2017     Priority: Medium     Formatting of this note might be different from the original.  And T5       Essential hypertension 05/26/2017     Priority: Medium     Fall from ladder, initial encounter 05/26/2017     Priority: Medium     Scalp laceration, initial encounter 05/26/2017     Priority: Medium     S/P total knee replacement, left 06/07/2016     Priority: Medium     S/P total knee replacement, right 06/07/2016     Priority: Medium     H/O nephrolithiasis 06/01/2016     Priority: Medium     Right knee DJD 07/18/2014     Priority: Medium     Lower urinary tract infectious disease 07/10/2014     Priority: Medium     Formatting of this note might be different from the original.  IMO Update       S/P total knee arthroplasty 07/10/2014     Priority: Medium     Status post THR (total hip replacement) 04/03/2014     Priority: Medium     Mixed dyslipidemia 04/03/2014     Priority: Medium     Dyslipidemia      Priority: Medium     TERESO (obstructive sleep apnea)      Priority: Medium     Prostate cancer (H) 04/26/2013     Priority: Medium     08/2011  S/P DaVinci prostatectomy       Actinic keratosis 08/06/2012     Priority:  Medium     Alopecia areata 2012     Priority: Medium     Inflamed seborrheic keratosis 2012     Priority: Medium     Osteoarthrosis, multiple sites 2011     Priority: Medium     ED (erectile dysfunction) 2011     Priority: Medium     BPH (benign prostatic hyerplasia) 2005     Priority: Medium     Type 2 diabetes mellitus without complication (H) 2004     Priority: Medium        Past Medical History:    Past Medical History:   Diagnosis Date     Allergic rhinitis, seasonal 3/25/2011     BPH 2005     Cancer, Prostate 3/25/2011     Chronic prostatitis 2007     Diabetes Mellitus, Type 2 2004     Dyslipidemia 3/25/2011     Eczema 3/25/2011     Erectile Dysfunction 3/25/2011     Mixed hyperlipidemia      Nephrolithiasis 2011     TERESO on CPAP      Osteoarthritis, Generalized 3/25/2011       Past Surgical History:    Past Surgical History:   Procedure Laterality Date     BIOPSY PROSTATE TRANSRECTAL       cataract extraction      right     COLONOSCOPY           COLONOSCOPY  2003     excision of acrochordon       excision of peritonsillar abscess       kidney stones blasted       laparoscopic bilateral inguinal hernia repair  2011    Bilateral inguinal hernia     prostatectomy  2011    Adenocarcinoma of the prostate     shoulder arthroscopy with Pam procedure      right     VASECTOMY         Family History:    Family History   Problem Relation Age of Onset     Prostate Cancer Father         cause of death     Diabetes Father      Hypertension Father      Other - See Comments Father         renal disease     Cerebrovascular Disease Mother         CVA     Diabetes Paternal Grandfather      Glaucoma Brother      Asthma No family hx of        Social History:  Marital Status:   [2]  Social History     Tobacco Use     Smoking status: Former     Types: Cigarettes     Quit date: 1969     Years since quittin.4     Smokeless tobacco: Never      "Tobacco comments:     no passive smoke exposure   Substance Use Topics     Alcohol use: Yes     Comment: socially     Drug use: No        Medications:    Cholecalciferol (VITAMIN D) 2000 UNITS tablet  DiphenhydrAMINE HCl, Sleep, 25 MG CAPS  ibuprofen (ADVIL,MOTRIN) 200 MG tablet  nabumetone (RELAFEN) 750 MG tablet  PSEUDOEPHEDRINE HCL 60 MG OR TABS  sitagliptin (JANUVIA) 100 MG tablet          Review of Systems   Constitutional: Negative for activity change, appetite change, chills and fever.   HENT: Negative for congestion, ear pain and sore throat.    Eyes: Negative for visual disturbance.   Respiratory: Negative for cough and shortness of breath.    Cardiovascular: Negative for chest pain.   Gastrointestinal: Negative for abdominal pain, diarrhea, nausea and vomiting.   Musculoskeletal: Negative for back pain, gait problem, neck pain and neck stiffness.   Skin: Positive for wound.   Neurological: Negative for dizziness, syncope, facial asymmetry, light-headedness, numbness and headaches.   All other systems reviewed and are negative.      Physical Exam   BP: (!) 199/105  Pulse: 74  Temp: 98.4  F (36.9  C)  Resp: 16  Height: 180.3 cm (5' 11\")  Weight: 91.8 kg (202 lb 6.1 oz)  SpO2: 97 %      Physical Exam  Vitals and nursing note reviewed.   Constitutional:       General: He is not in acute distress.     Appearance: Normal appearance. He is not ill-appearing.   HENT:      Right Ear: Tympanic membrane, ear canal and external ear normal. There is no impacted cerumen.      Left Ear: Tympanic membrane, ear canal and external ear normal. There is no impacted cerumen.      Nose: No congestion or rhinorrhea.      Mouth/Throat:      Mouth: Mucous membranes are moist.      Pharynx: Oropharynx is clear. No oropharyngeal exudate or posterior oropharyngeal erythema.   Eyes:      General:         Right eye: No discharge.         Left eye: No discharge.      Extraocular Movements: Extraocular movements intact.      " Conjunctiva/sclera: Conjunctivae normal.      Pupils: Pupils are equal, round, and reactive to light.   Cardiovascular:      Rate and Rhythm: Normal rate and regular rhythm.      Pulses: Normal pulses.      Heart sounds: Normal heart sounds.   Pulmonary:      Effort: Pulmonary effort is normal. No respiratory distress.      Breath sounds: Normal breath sounds. No stridor. No wheezing, rhonchi or rales.   Musculoskeletal:         General: No swelling or tenderness.   Skin:     General: Skin is warm and dry.      Findings: Abrasion present.          Neurological:      General: No focal deficit present.      Mental Status: He is alert and oriented to person, place, and time. Mental status is at baseline.      GCS: GCS eye subscore is 4. GCS verbal subscore is 5. GCS motor subscore is 6.      Cranial Nerves: No cranial nerve deficit or facial asymmetry.      Motor: No weakness, tremor or abnormal muscle tone.      Coordination: Coordination normal.      Gait: Gait normal.         ED Course                 Procedures                Results for orders placed or performed during the hospital encounter of 07/03/23 (from the past 24 hour(s))   Head CT w/o contrast    Narrative    PROCEDURE: CT HEAD W/O CONTRAST   7/3/2023 10:43 AM    HISTORY:Male, age,  83 years, , , fall from standing    COMPARISON:No relevant prior imaging.    TECHNIQUE: CT of the brain without contrast. Axial; sagittal and  coronal reconstructed images were reviewed.    FINDINGS: Ventricles and sulci are age-appropriate. Gray and white  matter demonstrate scattered areas of decreased density.    There is no evidence of mass, mass effect or midline shift. No  evidence of acute hemorrhage. Left supraorbital subcutaneous hematoma.  No acute fracture.       Impression    IMPRESSION:   No acute intracranial abnormality.  No acute fracture.     Left supraorbital subcutaneous hematoma/soft tissue contusion.      This facility minimizes radiation dose by  adjusting the mA and/or kV  according to each patient size.      This CT scan was performed using one or more the following dose  reduction techniques:    -Automated exposure control,  -Adjustment of the mA and/or kV according to patient's size, and/or,  -Use of iterative reconstruction technique.      SHNAKAR BRICE MD         SYSTEM ID:  [id number]       Medications - No data to display    Assessments & Plan (with Medical Decision Making)     I have reviewed the nursing notes.    I have reviewed the findings, diagnosis, plan and need for follow up with the patient.  Efrain Weiner is a 83 year old male who presents to the urgent care with complaints of an abrasion to his left forehead after tripping and falling last night. He notes that he had 2 drinks and lost his balance. Denies loss of consciousness. He denies chest pain, dizziness, and shortness of breath at time of fall. He does not take blood thinners. Denies fevers, chills, headaches, dizziness, n/v, chest pain, shortness of breath, and neck pain. No OTC medications taken. He is alert and oriented.     MDM: Head CT:No acute intracranial abnormality.  No acute fracture. Left supraorbital subcutaneous hematoma/soft tissue contusion, per radiologist. This is over the abrasion and area of tenderness.      BP elevated initially on arrival but improvement during course of UC stay. He is afebrile. Lungs clear, heart tones regular. TM clear bilaterally. No tenderness to neck on palpation or with movement. PERRL and 2mm. Strength equal. Neuro exam unremarkable. Karl to bear weight and ambulate without difficulty. He is not ill or toxic in appearance. No noted distress. There is a 3cm abrasion to left forehead, which he cleaned and applied ointment PTA. Discussed immediate return with any confusion, personality changes, vomiting, or other concerns. Advised to avoid alcohol.     (W19.XXXA) Fall, initial encounter, (S09.90XA) Closed head injury, initial  encounter  Plan: Please return with any dizziness, chest pain, headaches, fevers, or other concerns.     Keep wounds clean and dry. Caution in the sun    Tylenol and ibuprofen as needed for pain. Understanding verbalized.               Discharge Medication List as of 7/3/2023 11:04 AM          Final diagnoses:   Fall, initial encounter   Closed head injury, initial encounter       7/3/2023   [facility]     Anya Thakkar, AJAY  07/03/23 1122

## 2023-08-02 DIAGNOSIS — M19.90 OSTEOARTHRITIS, UNSPECIFIED OSTEOARTHRITIS TYPE, UNSPECIFIED SITE: ICD-10-CM

## 2023-08-17 NOTE — PROGRESS NOTES
"  Assessment & Plan     Type 2 diabetes mellitus without complication, without long-term current use of insulin (H)  Doing well.  Nice improvement.  Keep up the good work.  3 month diabetes followup.  Doing well and feeling well.   Labs reviewed in detail.       Essential hypertension  Stable.     Nephrolithiasis  No recurrence.  Added to chart.      TERESO (obstructive sleep apnea)  Uses cpap and it helps.  No change there.               BMI:   Estimated body mass index is 28.73 kg/m  as calculated from the following:    Height as of 7/3/23: 1.803 m (5' 11\").    Weight as of this encounter: 93.4 kg (206 lb).           No follow-ups on file.    Bib Spencer MD  [facility]    Charline Quiroz is a 83 year old, presenting for the following health issues:  Diabetes      HPI     Diabetes Follow-up    How often are you checking your blood sugar? Not at all  What concerns do you have today about your diabetes? None   Do you have any of these symptoms? (Select all that apply)  No numbness or tingling in feet.  No redness, sores or blisters on feet.  No complaints of excessive thirst.  No reports of blurry vision.  No significant changes to weight.      BP Readings from Last 2 Encounters:   08/24/23 130/72   07/03/23 174/96     Hemoglobin A1C (%)   Date Value   08/23/2023 7.3 (H)   05/22/2023 7.7 (H)   09/17/2020 7.2 (H)   08/27/2018 7.1 (H)     LDL Cholesterol Calculated (mg/dL)   Date Value   02/16/2023 101 (H)   02/18/2022 112 (H)   09/17/2020 107 (H)   05/23/2017 89             Hypertension Follow-up    Do you check your blood pressure regularly outside of the clinic? No   Are you following a low salt diet? Yes  Are your blood pressures ever more than 140 on the top number (systolic) OR more   than 90 on the bottom number (diastolic), for example 140/90? NA          Review of Systems   Constitutional, HEENT, cardiovascular, pulmonary, gi and gu systems are negative, except as otherwise " noted.      Objective    /72   Pulse 50   Temp 97.8  F (36.6  C) (Tympanic)   Wt 93.4 kg (206 lb)   SpO2 97%   BMI 28.73 kg/m    Body mass index is 28.73 kg/m .  Physical Exam   GENERAL: healthy, alert and no distress  NECK: no adenopathy, no asymmetry, masses, or scars and thyroid normal to palpation  RESP: lungs clear to auscultation - no rales, rhonchi or wheezes  CV: regular rate and rhythm, normal S1 S2, no S3 or S4, no murmur, click or rub, no peripheral edema and peripheral pulses strong  ABDOMEN: soft, nontender, no hepatosplenomegaly, no masses and bowel sounds normal  MS: no gross musculoskeletal defects noted, no edema

## 2023-08-23 ENCOUNTER — LAB (OUTPATIENT)
Dept: LAB | Facility: OTHER | Age: 83
End: 2023-08-23
Payer: MEDICARE

## 2023-08-23 DIAGNOSIS — E11.9 TYPE 2 DIABETES MELLITUS WITHOUT COMPLICATION, WITHOUT LONG-TERM CURRENT USE OF INSULIN (H): ICD-10-CM

## 2023-08-23 LAB
ANION GAP SERPL CALCULATED.3IONS-SCNC: 7 MMOL/L (ref 7–15)
BUN SERPL-MCNC: 16.6 MG/DL (ref 8–23)
CALCIUM SERPL-MCNC: 8.7 MG/DL (ref 8.8–10.2)
CHLORIDE SERPL-SCNC: 107 MMOL/L (ref 98–107)
CREAT SERPL-MCNC: 1.03 MG/DL (ref 0.67–1.17)
DEPRECATED HCO3 PLAS-SCNC: 25 MMOL/L (ref 22–29)
EST. AVERAGE GLUCOSE BLD GHB EST-MCNC: 163 MG/DL
GFR SERPL CREATININE-BSD FRML MDRD: 73 ML/MIN/1.73M2
GLUCOSE SERPL-MCNC: 136 MG/DL (ref 70–99)
HBA1C MFR BLD: 7.3 %
POTASSIUM SERPL-SCNC: 4.7 MMOL/L (ref 3.4–5.3)
SODIUM SERPL-SCNC: 139 MMOL/L (ref 136–145)

## 2023-08-23 PROCEDURE — 83036 HEMOGLOBIN GLYCOSYLATED A1C: CPT | Mod: ZL

## 2023-08-23 PROCEDURE — 36415 COLL VENOUS BLD VENIPUNCTURE: CPT | Mod: ZL

## 2023-08-23 PROCEDURE — 80048 BASIC METABOLIC PNL TOTAL CA: CPT | Mod: ZL

## 2023-08-24 ENCOUNTER — OFFICE VISIT (OUTPATIENT)
Dept: FAMILY MEDICINE | Facility: OTHER | Age: 83
End: 2023-08-24
Attending: FAMILY MEDICINE
Payer: COMMERCIAL

## 2023-08-24 VITALS
HEART RATE: 50 BPM | DIASTOLIC BLOOD PRESSURE: 72 MMHG | TEMPERATURE: 97.8 F | OXYGEN SATURATION: 97 % | WEIGHT: 206 LBS | BODY MASS INDEX: 28.73 KG/M2 | SYSTOLIC BLOOD PRESSURE: 130 MMHG

## 2023-08-24 DIAGNOSIS — G47.33 OSA (OBSTRUCTIVE SLEEP APNEA): ICD-10-CM

## 2023-08-24 DIAGNOSIS — E11.9 TYPE 2 DIABETES MELLITUS WITHOUT COMPLICATION, WITHOUT LONG-TERM CURRENT USE OF INSULIN (H): Primary | ICD-10-CM

## 2023-08-24 DIAGNOSIS — N20.0 NEPHROLITHIASIS: ICD-10-CM

## 2023-08-24 DIAGNOSIS — I10 ESSENTIAL HYPERTENSION: ICD-10-CM

## 2023-08-24 PROCEDURE — 99214 OFFICE O/P EST MOD 30 MIN: CPT | Performed by: FAMILY MEDICINE

## 2023-08-24 PROCEDURE — G0463 HOSPITAL OUTPT CLINIC VISIT: HCPCS

## 2023-08-24 ASSESSMENT — PAIN SCALES - GENERAL: PAINLEVEL: NO PAIN (0)

## 2023-08-29 DIAGNOSIS — E11.9 TYPE 2 DIABETES MELLITUS WITHOUT COMPLICATION, WITHOUT LONG-TERM CURRENT USE OF INSULIN (H): ICD-10-CM

## 2023-08-30 NOTE — TELEPHONE ENCOUNTER
Januvia       Last Written Prescription Date:  08/17/22  Last Fill Quantity: 90,   # refills: 3  Last Office Visit: 08/24/23  Future Office visit:    Next 5 appointments (look out 90 days)      Nov 20, 2023  1:45 PM  (Arrive by 1:30 PM)  SHORT with Bib Spencer MD  [facility] ([facility] ) [address] [facility] [address]  [phone]             Routing refill request to provider for review/approval because:

## 2023-08-31 RX ORDER — SITAGLIPTIN 100 MG/1
100 TABLET, FILM COATED ORAL DAILY
Qty: 90 TABLET | Refills: 1 | Status: SHIPPED | OUTPATIENT
Start: 2023-08-31 | End: 2023-11-20

## 2023-11-16 ENCOUNTER — LAB (OUTPATIENT)
Dept: LAB | Facility: OTHER | Age: 83
End: 2023-11-16
Payer: MEDICARE

## 2023-11-16 DIAGNOSIS — E11.9 TYPE 2 DIABETES MELLITUS WITHOUT COMPLICATION, WITHOUT LONG-TERM CURRENT USE OF INSULIN (H): ICD-10-CM

## 2023-11-16 LAB
ANION GAP SERPL CALCULATED.3IONS-SCNC: 8 MMOL/L (ref 7–15)
BUN SERPL-MCNC: 16.6 MG/DL (ref 8–23)
CALCIUM SERPL-MCNC: 8.6 MG/DL (ref 8.8–10.2)
CHLORIDE SERPL-SCNC: 106 MMOL/L (ref 98–107)
CREAT SERPL-MCNC: 1.14 MG/DL (ref 0.67–1.17)
DEPRECATED HCO3 PLAS-SCNC: 25 MMOL/L (ref 22–29)
EGFRCR SERPLBLD CKD-EPI 2021: 64 ML/MIN/1.73M2
EST. AVERAGE GLUCOSE BLD GHB EST-MCNC: 166 MG/DL
GLUCOSE SERPL-MCNC: 152 MG/DL (ref 70–99)
HBA1C MFR BLD: 7.4 %
POTASSIUM SERPL-SCNC: 4.7 MMOL/L (ref 3.4–5.3)
SODIUM SERPL-SCNC: 139 MMOL/L (ref 135–145)

## 2023-11-16 PROCEDURE — 83036 HEMOGLOBIN GLYCOSYLATED A1C: CPT | Mod: ZL

## 2023-11-16 PROCEDURE — 36415 COLL VENOUS BLD VENIPUNCTURE: CPT | Mod: ZL

## 2023-11-16 PROCEDURE — 80048 BASIC METABOLIC PNL TOTAL CA: CPT | Mod: ZL

## 2023-11-17 NOTE — PROGRESS NOTES
"  Assessment & Plan     Type 2 diabetes mellitus without complication, without long-term current use of insulin (H)  Doing great.  Labs stable.  Reliable patient doing well.  No change in cares. Follow.    - sitagliptin (JANUVIA) 100 MG tablet; Take 1 tablet (100 mg) by mouth daily    Essential hypertension  Stable. No pills and no change.  Bmp stable as well.        Next visit arranged.  All annual labs.  Labs today perfectly stable.         BMI:   Estimated body mass index is 29.11 kg/m  as calculated from the following:    Height as of 7/3/23: 1.803 m (5' 11\").    Weight as of this encounter: 94.7 kg (208 lb 11.2 oz).           No follow-ups on file.    Bib Spencer MD  [facility]    Charline Quiroz is a 83 year old, presenting for the following health issues:  Diabetes and Hypertension      HPI     Diabetes Follow-up    How often are you checking your blood sugar? Not at all  What concerns do you have today about your diabetes? None   Do you have any of these symptoms? (Select all that apply)  No numbness or tingling in feet.  No redness, sores or blisters on feet.  No complaints of excessive thirst.  No reports of blurry vision.  No significant changes to weight.  Have you had a diabetic eye exam in the last 12 months? No        BP Readings from Last 2 Encounters:   11/20/23 124/75   08/24/23 130/72     Hemoglobin A1C (%)   Date Value   11/16/2023 7.4 (H)   08/23/2023 7.3 (H)   09/17/2020 7.2 (H)   08/27/2018 7.1 (H)     LDL Cholesterol Calculated (mg/dL)   Date Value   02/16/2023 101 (H)   02/18/2022 112 (H)   09/17/2020 107 (H)   05/23/2017 89             Hypertension Follow-up    Do you check your blood pressure regularly outside of the clinic? No   Are you following a low salt diet? Yes  Are your blood pressures ever more than 140 on the top number (systolic) OR more   than 90 on the bottom number (diastolic), for example 140/90? No        Review of Systems   Constitutional, " HEENT, cardiovascular, pulmonary, gi and gu systems are negative, except as otherwise noted.      Objective    /75 (BP Location: Right arm, Patient Position: Sitting, Cuff Size: Adult Regular)   Pulse 54   Temp 97.1  F (36.2  C) (Tympanic)   Wt 94.7 kg (208 lb 11.2 oz)   SpO2 98%   BMI 29.11 kg/m    Body mass index is 29.11 kg/m .  Physical Exam   GENERAL: healthy, alert and no distress  NECK: no adenopathy, no asymmetry, masses, or scars and thyroid normal to palpation  RESP: lungs clear to auscultation - no rales, rhonchi or wheezes  CV: regular rate and rhythm, normal S1 S2, no S3 or S4, no murmur, click or rub, no peripheral edema and peripheral pulses strong  ABDOMEN: soft, nontender, no hepatosplenomegaly, no masses and bowel sounds normal  MS: no gross musculoskeletal defects noted, no edema            Labs reviewed in detail.

## 2023-11-20 ENCOUNTER — OFFICE VISIT (OUTPATIENT)
Dept: FAMILY MEDICINE | Facility: OTHER | Age: 83
End: 2023-11-20
Attending: FAMILY MEDICINE
Payer: COMMERCIAL

## 2023-11-20 VITALS
BODY MASS INDEX: 29.11 KG/M2 | TEMPERATURE: 97.1 F | HEART RATE: 54 BPM | DIASTOLIC BLOOD PRESSURE: 75 MMHG | WEIGHT: 208.7 LBS | OXYGEN SATURATION: 98 % | SYSTOLIC BLOOD PRESSURE: 124 MMHG

## 2023-11-20 DIAGNOSIS — E11.9 TYPE 2 DIABETES MELLITUS WITHOUT COMPLICATION, WITHOUT LONG-TERM CURRENT USE OF INSULIN (H): ICD-10-CM

## 2023-11-20 DIAGNOSIS — I10 ESSENTIAL HYPERTENSION: Primary | ICD-10-CM

## 2023-11-20 DIAGNOSIS — E78.2 MIXED DYSLIPIDEMIA: ICD-10-CM

## 2023-11-20 PROCEDURE — 99214 OFFICE O/P EST MOD 30 MIN: CPT | Performed by: FAMILY MEDICINE

## 2023-11-20 PROCEDURE — G0463 HOSPITAL OUTPT CLINIC VISIT: HCPCS

## 2024-02-13 NOTE — PROGRESS NOTES
"  Assessment & Plan     Type 2 diabetes mellitus without complication, without long-term current use of insulin (H)  Stable.  He got through the holidays and valentine's day without a rise in the A1c.  Slight protein in the urine.  Stable foot exam.  Routine cares and follow.    - GA FOOT EXAM NO CHARGE  - Hemoglobin A1c; Future  - Basic metabolic panel; Future    Essential hypertension  Stable.     Mixed dyslipidemia  Stable.     TERESO (obstructive sleep apnea)  Reviewed.  Send to [facility].    - Miscellaneous Order for DME - ONLY FOR DME            BMI  Estimated body mass index is 28.31 kg/m  as calculated from the following:    Height as of 7/3/23: 1.803 m (5' 11\").    Weight as of this encounter: 92.1 kg (203 lb).             No follow-ups on file.    Charline Quiroz is a 83 year old, presenting for the following health issues:  Diabetes    HPI     Diabetes Follow-up    How often are you checking your blood sugar? Not at all  What concerns do you have today about your diabetes? None   Do you have any of these symptoms? (Select all that apply)  No numbness or tingling in feet.  No redness, sores or blisters on feet.  No complaints of excessive thirst.  No reports of blurry vision.  No significant changes to weight.  Have you had a diabetic eye exam in the last 12 months? No            Hyperlipidemia Follow-Up    Are you regularly taking any medication or supplement to lower your cholesterol?   No  Are you having muscle aches or other side effects that you think could be caused by your cholesterol lowering medication?  No    Hypertension Follow-up    Do you check your blood pressure regularly outside of the clinic? No   Are you following a low salt diet? Yes  Are your blood pressures ever more than 140 on the top number (systolic) OR more   than 90 on the bottom number (diastolic), for example 140/90? NA    BP Readings from Last 2 Encounters:   02/20/24 132/78   11/20/23 124/75     Hemoglobin A1C (%)   Date Value "   02/15/2024 7.4 (H)   11/16/2023 7.4 (H)   09/17/2020 7.2 (H)   08/27/2018 7.1 (H)     LDL Cholesterol Calculated (mg/dL)   Date Value   02/15/2024 109 (H)   02/16/2023 101 (H)   09/17/2020 107 (H)   05/23/2017 89     Diabetic foot exam   1. foot deformity   Yes hammer toes and bunions  2. Current or previous foot ulceration     No  3. Current or previous pre-ulcerative calluses     Yes  4. previous partial amputation of one or both feet or complete amputation of one foot     No  5. peripheral neuropathy with evidence of callus formation     No  6. poor circulation     No  Approval given for 3 pairs of diabetic shoes and inserts if patient desires.            Review of Systems  Constitutional, HEENT, cardiovascular, pulmonary, gi and gu systems are negative, except as otherwise noted.      Objective    /78   Pulse 62   Temp 97.4  F (36.3  C) (Tympanic)   Wt 92.1 kg (203 lb)   SpO2 95%   BMI 28.31 kg/m    Body mass index is 28.31 kg/m .  Physical Exam   GENERAL: alert and no distress  NECK: no adenopathy, no asymmetry, masses, or scars  RESP: lungs clear to auscultation - no rales, rhonchi or wheezes  CV: regular rate and rhythm, normal S1 S2, no S3 or S4, no murmur, click or rub, no peripheral edema  ABDOMEN: soft, nontender, no hepatosplenomegaly, no masses and bowel sounds normal  MS: no gross musculoskeletal defects noted, no edema    Labs reviewed in detail.          Signed Electronically by: Bib Spencer MD

## 2024-02-15 ENCOUNTER — LAB (OUTPATIENT)
Dept: LAB | Facility: OTHER | Age: 84
End: 2024-02-15
Payer: MEDICARE

## 2024-02-15 DIAGNOSIS — E11.9 TYPE 2 DIABETES MELLITUS WITHOUT COMPLICATION, WITHOUT LONG-TERM CURRENT USE OF INSULIN (H): ICD-10-CM

## 2024-02-15 DIAGNOSIS — E78.2 MIXED DYSLIPIDEMIA: ICD-10-CM

## 2024-02-15 LAB
ALBUMIN SERPL BCG-MCNC: 3.2 G/DL (ref 3.5–5.2)
ALP SERPL-CCNC: 97 U/L (ref 40–150)
ALT SERPL W P-5'-P-CCNC: 15 U/L (ref 0–70)
ANION GAP SERPL CALCULATED.3IONS-SCNC: 8 MMOL/L (ref 7–15)
AST SERPL W P-5'-P-CCNC: 17 U/L (ref 0–45)
BILIRUB SERPL-MCNC: 1 MG/DL
BUN SERPL-MCNC: 15.3 MG/DL (ref 8–23)
CALCIUM SERPL-MCNC: 8.5 MG/DL (ref 8.8–10.2)
CHLORIDE SERPL-SCNC: 105 MMOL/L (ref 98–107)
CHOLEST SERPL-MCNC: 174 MG/DL
CREAT SERPL-MCNC: 1.06 MG/DL (ref 0.67–1.17)
CREAT UR-MCNC: 121.9 MG/DL
DEPRECATED HCO3 PLAS-SCNC: 25 MMOL/L (ref 22–29)
EGFRCR SERPLBLD CKD-EPI 2021: 70 ML/MIN/1.73M2
EST. AVERAGE GLUCOSE BLD GHB EST-MCNC: 166 MG/DL
FASTING STATUS PATIENT QL REPORTED: YES
GLUCOSE SERPL-MCNC: 138 MG/DL (ref 70–99)
HBA1C MFR BLD: 7.4 %
HDLC SERPL-MCNC: 53 MG/DL
LDLC SERPL CALC-MCNC: 109 MG/DL
MICROALBUMIN UR-MCNC: 24.7 MG/L
MICROALBUMIN/CREAT UR: 20.26 MG/G CR (ref 0–17)
NONHDLC SERPL-MCNC: 121 MG/DL
POTASSIUM SERPL-SCNC: 4.4 MMOL/L (ref 3.4–5.3)
PROT SERPL-MCNC: 7.3 G/DL (ref 6.4–8.3)
SODIUM SERPL-SCNC: 138 MMOL/L (ref 135–145)
TRIGL SERPL-MCNC: 60 MG/DL
TSH SERPL DL<=0.005 MIU/L-ACNC: 2.31 UIU/ML (ref 0.3–4.2)

## 2024-02-15 PROCEDURE — 36415 COLL VENOUS BLD VENIPUNCTURE: CPT | Mod: ZL

## 2024-02-15 PROCEDURE — 80053 COMPREHEN METABOLIC PANEL: CPT | Mod: ZL

## 2024-02-15 PROCEDURE — 80061 LIPID PANEL: CPT | Mod: ZL

## 2024-02-15 PROCEDURE — 82570 ASSAY OF URINE CREATININE: CPT | Mod: ZL

## 2024-02-15 PROCEDURE — 84443 ASSAY THYROID STIM HORMONE: CPT | Mod: ZL

## 2024-02-15 PROCEDURE — 83036 HEMOGLOBIN GLYCOSYLATED A1C: CPT | Mod: ZL

## 2024-02-20 ENCOUNTER — OFFICE VISIT (OUTPATIENT)
Dept: FAMILY MEDICINE | Facility: OTHER | Age: 84
End: 2024-02-20
Attending: FAMILY MEDICINE
Payer: COMMERCIAL

## 2024-02-20 VITALS
BODY MASS INDEX: 28.31 KG/M2 | TEMPERATURE: 97.4 F | WEIGHT: 203 LBS | HEART RATE: 62 BPM | DIASTOLIC BLOOD PRESSURE: 78 MMHG | SYSTOLIC BLOOD PRESSURE: 132 MMHG | OXYGEN SATURATION: 95 %

## 2024-02-20 DIAGNOSIS — E11.9 TYPE 2 DIABETES MELLITUS WITHOUT COMPLICATION, WITHOUT LONG-TERM CURRENT USE OF INSULIN (H): Primary | ICD-10-CM

## 2024-02-20 DIAGNOSIS — G47.33 OSA (OBSTRUCTIVE SLEEP APNEA): ICD-10-CM

## 2024-02-20 DIAGNOSIS — E78.2 MIXED DYSLIPIDEMIA: ICD-10-CM

## 2024-02-20 DIAGNOSIS — I10 ESSENTIAL HYPERTENSION: ICD-10-CM

## 2024-02-20 PROCEDURE — G0463 HOSPITAL OUTPT CLINIC VISIT: HCPCS

## 2024-02-20 PROCEDURE — 99207 PR FOOT EXAM NO CHARGE: CPT | Performed by: FAMILY MEDICINE

## 2024-02-20 PROCEDURE — 99214 OFFICE O/P EST MOD 30 MIN: CPT | Performed by: FAMILY MEDICINE

## 2024-02-20 ASSESSMENT — PAIN SCALES - GENERAL: PAINLEVEL: NO PAIN (0)

## 2024-02-26 DIAGNOSIS — M19.90 OSTEOARTHRITIS, UNSPECIFIED OSTEOARTHRITIS TYPE, UNSPECIFIED SITE: ICD-10-CM

## 2024-02-26 NOTE — TELEPHONE ENCOUNTER
nabumetone 750 mg tablet       Last Written Prescription Date:  8/2/23  Last Fill Quantity: 180,   # refills: 1  Last Office Visit: 2/20/24  Future Office visit:    Next 5 appointments (look out 90 days)      May 20, 2024  1:45 PM  (Arrive by 1:30 PM)  SHORT with Bib Spencer MD  [facility] ) [address]  [facility] [address]  [phone]             Routing refill request to provider for review/approval because:

## 2024-05-16 ENCOUNTER — LAB (OUTPATIENT)
Dept: LAB | Facility: OTHER | Age: 84
End: 2024-05-16
Payer: MEDICARE

## 2024-05-16 DIAGNOSIS — E11.9 TYPE 2 DIABETES MELLITUS WITHOUT COMPLICATION, WITHOUT LONG-TERM CURRENT USE OF INSULIN (H): ICD-10-CM

## 2024-05-16 LAB
ANION GAP SERPL CALCULATED.3IONS-SCNC: 7 MMOL/L (ref 7–15)
BUN SERPL-MCNC: 17.9 MG/DL (ref 8–23)
CALCIUM SERPL-MCNC: 8.9 MG/DL (ref 8.8–10.2)
CHLORIDE SERPL-SCNC: 108 MMOL/L (ref 98–107)
CREAT SERPL-MCNC: 1.16 MG/DL (ref 0.67–1.17)
DEPRECATED HCO3 PLAS-SCNC: 26 MMOL/L (ref 22–29)
EGFRCR SERPLBLD CKD-EPI 2021: 62 ML/MIN/1.73M2
EST. AVERAGE GLUCOSE BLD GHB EST-MCNC: 180 MG/DL
GLUCOSE SERPL-MCNC: 157 MG/DL (ref 70–99)
HBA1C MFR BLD: 7.9 %
POTASSIUM SERPL-SCNC: 4.4 MMOL/L (ref 3.4–5.3)
SODIUM SERPL-SCNC: 141 MMOL/L (ref 135–145)

## 2024-05-16 PROCEDURE — 80048 BASIC METABOLIC PNL TOTAL CA: CPT | Mod: ZL

## 2024-05-16 PROCEDURE — 36415 COLL VENOUS BLD VENIPUNCTURE: CPT | Mod: ZL

## 2024-05-16 PROCEDURE — 83036 HEMOGLOBIN GLYCOSYLATED A1C: CPT | Mod: ZL

## 2024-05-20 ENCOUNTER — OFFICE VISIT (OUTPATIENT)
Dept: FAMILY MEDICINE | Facility: OTHER | Age: 84
End: 2024-05-20
Attending: FAMILY MEDICINE
Payer: COMMERCIAL

## 2024-05-20 VITALS
OXYGEN SATURATION: 95 % | DIASTOLIC BLOOD PRESSURE: 76 MMHG | TEMPERATURE: 97.3 F | BODY MASS INDEX: 29.82 KG/M2 | HEART RATE: 57 BPM | SYSTOLIC BLOOD PRESSURE: 132 MMHG | HEIGHT: 71 IN | WEIGHT: 213 LBS

## 2024-05-20 DIAGNOSIS — E11.9 TYPE 2 DIABETES MELLITUS WITHOUT COMPLICATION, WITHOUT LONG-TERM CURRENT USE OF INSULIN (H): Primary | ICD-10-CM

## 2024-05-20 DIAGNOSIS — G47.33 OSA (OBSTRUCTIVE SLEEP APNEA): ICD-10-CM

## 2024-05-20 DIAGNOSIS — I10 ESSENTIAL HYPERTENSION: ICD-10-CM

## 2024-05-20 PROCEDURE — G0463 HOSPITAL OUTPT CLINIC VISIT: HCPCS

## 2024-05-20 PROCEDURE — 99213 OFFICE O/P EST LOW 20 MIN: CPT | Performed by: FAMILY MEDICINE

## 2024-05-20 ASSESSMENT — PAIN SCALES - GENERAL: PAINLEVEL: NO PAIN (0)

## 2024-05-20 NOTE — PROGRESS NOTES
"  Assessment & Plan     Type 2 diabetes mellitus without complication, without long-term current use of insulin (H)  Increased some.  Was eating more sweets and moving less.  Getting going again and now golfing.  No change in meds.  Watch the sweets.  3 month followup with labs prior.    - Hemoglobin A1c; Future  - Basic metabolic panel; Future    Essential hypertension  Stable.      TERESO (obstructive sleep apnea)  He hasn't been using cpap and sleeping on his recliner. We talked about that.           BMI  Estimated body mass index is 29.71 kg/m  as calculated from the following:    Height as of this encounter: 1.803 m (5' 11\").    Weight as of this encounter: 96.6 kg (213 lb).             No follow-ups on file.    Charline Quiroz is a 84 year old, presenting for the following health issues:  Diabetes        5/20/2024     1:32 PM   Additional Questions   Roomed by Dayana   Accompanied by self     HPI     Diabetes Follow-up    How often are you checking your blood sugar? Not at all  What concerns do you have today about your diabetes? None   Do you have any of these symptoms? (Select all that apply)  No numbness or tingling in feet.  No redness, sores or blisters on feet.  No complaints of excessive thirst.  No reports of blurry vision.  No significant changes to weight.  Have you had a diabetic eye exam in the last 12 months? No        BP Readings from Last 2 Encounters:   05/20/24 132/76   02/20/24 132/78     Hemoglobin A1C (%)   Date Value   05/16/2024 7.9 (H)   02/15/2024 7.4 (H)   09/17/2020 7.2 (H)   08/27/2018 7.1 (H)     LDL Cholesterol Calculated (mg/dL)   Date Value   02/15/2024 109 (H)   02/16/2023 101 (H)   09/17/2020 107 (H)   05/23/2017 89             Hypertension Follow-up    Do you check your blood pressure regularly outside of the clinic? No   Are you following a low salt diet? Yes  Are your blood pressures ever more than 140 on the top number (systolic) OR more   than 90 on the bottom number " "(diastolic), for example 140/90? NA        Review of Systems  Constitutional, HEENT, cardiovascular, pulmonary, gi and gu systems are negative, except as otherwise noted.      Objective    /76   Pulse 57   Temp 97.3  F (36.3  C) (Tympanic)   Ht 1.803 m (5' 11\")   Wt 96.6 kg (213 lb)   SpO2 95%   BMI 29.71 kg/m    Body mass index is 29.71 kg/m .  Physical Exam   GENERAL: alert and no distress  NECK: no adenopathy, no asymmetry, masses, or scars  RESP: lungs clear to auscultation - no rales, rhonchi or wheezes  CV: regular rate and rhythm, normal S1 S2, no S3 or S4, no murmur, click or rub, no peripheral edema  ABDOMEN: soft, nontender, no hepatosplenomegaly, no masses and bowel sounds normal  MS: no gross musculoskeletal defects noted, no edema    Labs reviewed in detail A1c 7.9.         Signed Electronically by: Bib Spencer MD    "

## 2024-08-03 DIAGNOSIS — E11.9 TYPE 2 DIABETES MELLITUS WITHOUT COMPLICATION, WITHOUT LONG-TERM CURRENT USE OF INSULIN (H): ICD-10-CM

## 2024-08-05 RX ORDER — SITAGLIPTIN 100 MG/1
100 TABLET, FILM COATED ORAL DAILY
Qty: 90 TABLET | Refills: 3 | Status: SHIPPED | OUTPATIENT
Start: 2024-08-05

## 2024-08-14 NOTE — PROGRESS NOTES
"  Assessment & Plan     Type 2 diabetes mellitus without complication, without long-term current use of insulin (H)  Doing well. Nice improvement probably due to activity.  No real diet change at all.   Recent URI without ongoing concerns.  Follow routine.  Nice review of considerations of glucose management.  He is very blunt that he cheats all the time and didn't really change anything.    - Hemoglobin A1c; Future  - Basic metabolic panel; Future    Essential hypertension  Stable.  Update and follow.            BMI  Estimated body mass index is 29.29 kg/m  as calculated from the following:    Height as of 5/20/24: 1.803 m (5' 11\").    Weight as of this encounter: 95.3 kg (210 lb).             No follow-ups on file.    Charline Quiroz is a 84 year old, presenting for the following health issues:  Diabetes        8/21/2024     1:42 PM   Additional Questions   Roomed by Dayana   Accompanied by self     HPI     Diabetes Follow-up    How often are you checking your blood sugar? Not at all  What concerns do you have today about your diabetes? None   Do you have any of these symptoms? (Select all that apply)  No numbness or tingling in feet.  No redness, sores or blisters on feet.  No complaints of excessive thirst.  No reports of blurry vision.  No significant changes to weight.  Have you had a diabetic eye exam in the last 12 months? No        BP Readings from Last 2 Encounters:   08/21/24 116/70   05/20/24 132/76     Hemoglobin A1C (%)   Date Value   08/16/2024 7.1 (H)   05/16/2024 7.9 (H)   09/17/2020 7.2 (H)   08/27/2018 7.1 (H)     LDL Cholesterol Calculated (mg/dL)   Date Value   02/15/2024 109 (H)   02/16/2023 101 (H)   09/17/2020 107 (H)   05/23/2017 89             Hypertension Follow-up    Do you check your blood pressure regularly outside of the clinic? No   Are you following a low salt diet? Yes  Are your blood pressures ever more than 140 on the top number (systolic) OR more   than 90 on the bottom number " (diastolic), for example 140/90? NA        RESPIRATORY SYMPTOMS    Duration: 4 days   Description  nasal congestion, cough, fever, headache, fatigue/malaise, and myalgias  Severity: moderate  Accompanying signs and symptoms: None  History (predisposing factors):  none  Precipitating or alleviating factors: None  Therapies tried and outcome:  none         Review of Systems  Constitutional, HEENT, cardiovascular, pulmonary, gi and gu systems are negative, except as otherwise noted.      Objective    /70   Pulse 75   Temp 98.6  F (37  C) (Tympanic)   Wt 95.3 kg (210 lb)   SpO2 97%   BMI 29.29 kg/m    Body mass index is 29.29 kg/m .  Physical Exam   GENERAL: alert and no distress  NECK: no adenopathy, no asymmetry, masses, or scars  RESP: lungs clear to auscultation - no rales, rhonchi or wheezes  CV: regular rate and rhythm, normal S1 S2, no S3 or S4, no murmur, click or rub, no peripheral edema  ABDOMEN: soft, nontender, no hepatosplenomegaly, no masses and bowel sounds normal  MS: no gross musculoskeletal defects noted, no edema    Labs reviewed in detail and ordered for next visit.     The longitudinal plan of care for the diagnosis(es)/condition(s) as documented were addressed during this visit. Due to the added complexity in care, I will continue to support Art in the subsequent management and with ongoing continuity of care.        Signed Electronically by: Bib Spencer MD

## 2024-08-16 ENCOUNTER — LAB (OUTPATIENT)
Dept: LAB | Facility: OTHER | Age: 84
End: 2024-08-16
Payer: MEDICARE

## 2024-08-16 DIAGNOSIS — E11.9 TYPE 2 DIABETES MELLITUS WITHOUT COMPLICATION, WITHOUT LONG-TERM CURRENT USE OF INSULIN (H): ICD-10-CM

## 2024-08-16 LAB
ANION GAP SERPL CALCULATED.3IONS-SCNC: 9 MMOL/L (ref 7–15)
BUN SERPL-MCNC: 17.2 MG/DL (ref 8–23)
CALCIUM SERPL-MCNC: 8.7 MG/DL (ref 8.8–10.4)
CHLORIDE SERPL-SCNC: 107 MMOL/L (ref 98–107)
CREAT SERPL-MCNC: 1.03 MG/DL (ref 0.67–1.17)
EGFRCR SERPLBLD CKD-EPI 2021: 72 ML/MIN/1.73M2
EST. AVERAGE GLUCOSE BLD GHB EST-MCNC: 157 MG/DL
GLUCOSE SERPL-MCNC: 156 MG/DL (ref 70–99)
HBA1C MFR BLD: 7.1 %
HCO3 SERPL-SCNC: 22 MMOL/L (ref 22–29)
POTASSIUM SERPL-SCNC: 4.4 MMOL/L (ref 3.4–5.3)
SODIUM SERPL-SCNC: 138 MMOL/L (ref 135–145)

## 2024-08-16 PROCEDURE — 36415 COLL VENOUS BLD VENIPUNCTURE: CPT | Mod: ZL

## 2024-08-16 PROCEDURE — 80048 BASIC METABOLIC PNL TOTAL CA: CPT | Mod: ZL

## 2024-08-16 PROCEDURE — 83036 HEMOGLOBIN GLYCOSYLATED A1C: CPT | Mod: ZL

## 2024-08-21 ENCOUNTER — OFFICE VISIT (OUTPATIENT)
Dept: FAMILY MEDICINE | Facility: OTHER | Age: 84
End: 2024-08-21
Attending: FAMILY MEDICINE
Payer: COMMERCIAL

## 2024-08-21 VITALS
HEART RATE: 75 BPM | SYSTOLIC BLOOD PRESSURE: 116 MMHG | DIASTOLIC BLOOD PRESSURE: 70 MMHG | WEIGHT: 210 LBS | BODY MASS INDEX: 29.29 KG/M2 | TEMPERATURE: 98.6 F | OXYGEN SATURATION: 97 %

## 2024-08-21 DIAGNOSIS — E11.9 TYPE 2 DIABETES MELLITUS WITHOUT COMPLICATION, WITHOUT LONG-TERM CURRENT USE OF INSULIN (H): Primary | ICD-10-CM

## 2024-08-21 DIAGNOSIS — I10 ESSENTIAL HYPERTENSION: ICD-10-CM

## 2024-08-21 PROCEDURE — G0463 HOSPITAL OUTPT CLINIC VISIT: HCPCS | Performed by: FAMILY MEDICINE

## 2024-08-21 PROCEDURE — 99213 OFFICE O/P EST LOW 20 MIN: CPT | Performed by: FAMILY MEDICINE

## 2024-08-21 ASSESSMENT — PAIN SCALES - GENERAL: PAINLEVEL: MILD PAIN (2)

## 2024-11-13 NOTE — PROGRESS NOTES
"  Assessment & Plan     Type 2 diabetes mellitus without complication, without long-term current use of insulin (H)  Nice and stable.  Doing well.  Crept up slightly.  No low sugars.  Continue januvia.  Doing great.      TERESO (obstructive sleep apnea)  Stable sx.  He sleeps in the lazyboy chair.  He declines represcribing his cpap again as he would not want to at this time.      Essential hypertension  Stable.      Labs reviewed in detail as above.                BMI  Estimated body mass index is 29.57 kg/m  as calculated from the following:    Height as of 5/20/24: 1.803 m (5' 11\").    Weight as of this encounter: 96.2 kg (212 lb).             No follow-ups on file.    Charline Quiroz is a 84 year old, presenting for the following health issues:  Diabetes        11/21/2024     1:08 PM   Additional Questions   Roomed by Dayana WALKER     Diabetes Follow-up    How often are you checking your blood sugar? Not at all  What concerns do you have today about your diabetes? None   Do you have any of these symptoms? (Select all that apply)  No numbness or tingling in feet.  No redness, sores or blisters on feet.  No complaints of excessive thirst.  No reports of blurry vision.  No significant changes to weight.  Have you had a diabetic eye exam in the last 12 months? No        BP Readings from Last 2 Encounters:   11/21/24 122/82   08/21/24 116/70     Hemoglobin A1C (%)   Date Value   11/18/2024 7.3 (H)   08/16/2024 7.1 (H)   09/17/2020 7.2 (H)   08/27/2018 7.1 (H)     LDL Cholesterol Calculated (mg/dL)   Date Value   02/15/2024 109 (H)   02/16/2023 101 (H)   09/17/2020 107 (H)   05/23/2017 89             Hypertension Follow-up    Do you check your blood pressure regularly outside of the clinic? No   Are you following a low salt diet? Yes  Are your blood pressures ever more than 140 on the top number (systolic) OR more   than 90 on the bottom number (diastolic), for example 140/90? N/A          Review of " Systems  Constitutional, HEENT, cardiovascular, pulmonary, gi and gu systems are negative, except as otherwise noted.      Objective    /82   Pulse 62   Temp 97.3  F (36.3  C) (Tympanic)   Wt 96.2 kg (212 lb)   SpO2 96%   BMI 29.57 kg/m    Body mass index is 29.57 kg/m .  Physical Exam   GENERAL: alert and no distress  NECK: no adenopathy, no asymmetry, masses, or scars  RESP: lungs clear to auscultation - no rales, rhonchi or wheezes  CV: regular rate and rhythm, normal S1 S2, no S3 or S4, no murmur, click or rub, no peripheral edema  ABDOMEN: soft, nontender, no hepatosplenomegaly, no masses and bowel sounds normal  MS: no gross musculoskeletal defects noted, no edema    Labs reviewed.  A1c 7.3.      The longitudinal plan of care for the diagnosis(es)/condition(s) as documented were addressed during this visit. Due to the added complexity in care, I will continue to support Art in the subsequent management and with ongoing continuity of care.        Signed Electronically by: Bib Spencer MD

## 2024-11-18 ENCOUNTER — LAB (OUTPATIENT)
Dept: LAB | Facility: OTHER | Age: 84
End: 2024-11-18
Payer: MEDICARE

## 2024-11-18 DIAGNOSIS — E11.9 TYPE 2 DIABETES MELLITUS WITHOUT COMPLICATION, WITHOUT LONG-TERM CURRENT USE OF INSULIN (H): ICD-10-CM

## 2024-11-18 LAB
ANION GAP SERPL CALCULATED.3IONS-SCNC: 9 MMOL/L (ref 7–15)
BUN SERPL-MCNC: 17.3 MG/DL (ref 8–23)
CALCIUM SERPL-MCNC: 8.8 MG/DL (ref 8.8–10.4)
CHLORIDE SERPL-SCNC: 105 MMOL/L (ref 98–107)
CREAT SERPL-MCNC: 1.09 MG/DL (ref 0.67–1.17)
EGFRCR SERPLBLD CKD-EPI 2021: 67 ML/MIN/1.73M2
EST. AVERAGE GLUCOSE BLD GHB EST-MCNC: 163 MG/DL
GLUCOSE SERPL-MCNC: 145 MG/DL (ref 70–99)
HBA1C MFR BLD: 7.3 %
HCO3 SERPL-SCNC: 23 MMOL/L (ref 22–29)
POTASSIUM SERPL-SCNC: 4.5 MMOL/L (ref 3.4–5.3)
SODIUM SERPL-SCNC: 137 MMOL/L (ref 135–145)

## 2024-11-18 PROCEDURE — 80048 BASIC METABOLIC PNL TOTAL CA: CPT | Mod: ZL

## 2024-11-18 PROCEDURE — 83036 HEMOGLOBIN GLYCOSYLATED A1C: CPT | Mod: ZL

## 2024-11-18 PROCEDURE — 82374 ASSAY BLOOD CARBON DIOXIDE: CPT | Mod: ZL

## 2024-11-18 PROCEDURE — 36415 COLL VENOUS BLD VENIPUNCTURE: CPT | Mod: ZL

## 2024-11-21 ENCOUNTER — OFFICE VISIT (OUTPATIENT)
Dept: FAMILY MEDICINE | Facility: OTHER | Age: 84
End: 2024-11-21
Attending: FAMILY MEDICINE
Payer: MEDICARE

## 2024-11-21 VITALS
OXYGEN SATURATION: 96 % | HEART RATE: 62 BPM | WEIGHT: 212 LBS | DIASTOLIC BLOOD PRESSURE: 82 MMHG | SYSTOLIC BLOOD PRESSURE: 122 MMHG | TEMPERATURE: 97.3 F | BODY MASS INDEX: 29.57 KG/M2

## 2024-11-21 DIAGNOSIS — C61 PROSTATE CANCER (H): ICD-10-CM

## 2024-11-21 DIAGNOSIS — E11.9 TYPE 2 DIABETES MELLITUS WITHOUT COMPLICATION, WITHOUT LONG-TERM CURRENT USE OF INSULIN (H): Primary | ICD-10-CM

## 2024-11-21 DIAGNOSIS — I10 ESSENTIAL HYPERTENSION: ICD-10-CM

## 2024-11-21 DIAGNOSIS — G47.33 OSA (OBSTRUCTIVE SLEEP APNEA): ICD-10-CM

## 2024-11-21 PROCEDURE — G0463 HOSPITAL OUTPT CLINIC VISIT: HCPCS

## 2024-11-21 ASSESSMENT — PAIN SCALES - GENERAL: PAINLEVEL_OUTOF10: NO PAIN (0)

## 2025-01-18 NOTE — PROGRESS NOTES
"SUBJECTIVE:   Efrain Weiner is a 82 year old male who presents for Preventive Visit.      Patient has been advised of split billing requirements and indicates understanding: Yes  Are you in the first 12 months of your Medicare coverage?  No    Healthy Habits:     In general, how would you rate your overall health?  Excellent    Frequency of exercise:  None    Do you usually eat at least 4 servings of fruit and vegetables a day, include whole grains    & fiber and avoid regularly eating high fat or \"junk\" foods?  No    Taking medications regularly:  Yes    Medication side effects:  None    Ability to successfully perform activities of daily living:  No assistance needed    Home Safety:  No safety concerns identified    Hearing Impairment:  No hearing concerns    In the past 6 months, have you been bothered by leaking of urine? Yes    In general, how would you rate your overall mental or emotional health?  Excellent      PHQ-2 Total Score: 0    Additional concerns today:  No  Imm/Inj      Do you feel safe in your environment? Yes    Have you ever done Advance Care Planning? (For example, a Health Directive, POLST, or a discussion with a medical provider or your loved ones about your wishes): No, advance care planning information given to patient to review.  Patient declined advance care planning discussion at this time.       Fall risk  Fallen 2 or more times in the past year?: No  Any fall with injury in the past year?: No    Cognitive Screening   1) Repeat 3 items (Leader, Season, Table)    2) Clock draw: ABNORMAL  3) 3 item recall: Recalls NO objects   Results: 0 items recalled: PROBABLE COGNITIVE IMPAIRMENT, **INFORM PROVIDER**    Mini-CogTM Porsha Kline. Licensed by the author for use in [facility]; reprinted with permission ([email or web address]). All rights reserved.      Do you have sleep apnea, excessive snoring or daytime drowsiness?: sleep apnea    Reviewed and updated as needed this visit by " clinical staff   Tobacco  Allergies  Meds   Med Hx  Surg Hx  Fam Hx  Soc Hx        Reviewed and updated as needed this visit by Provider                 Social History     Tobacco Use     Smoking status: Former Smoker     Types: Cigarettes     Quit date: 1969     Years since quittin.1     Smokeless tobacco: Never Used     Tobacco comment: no passive smoke exposure   Substance Use Topics     Alcohol use: Yes     Comment: socially         Alcohol Use 2022   Prescreen: >3 drinks/day or >7 drinks/week? No         Pt is using CPAP machine and is benefiting from use, feeling better and less sleepy and fatigue.         Current providers sharing in care for this patient include:   Patient Care Team:  Bib Spencer MD as PCP - General (Family Medicine)  Rodolfo Cisneros MD as Assigned PCP  Marcia Camara PA-C as Assigned Surgical Provider    The following health maintenance items are reviewed in Epic and correct as of today:  Health Maintenance Due   Topic Date Due     DIABETIC FOOT EXAM  Never done     ZOSTER IMMUNIZATION (1 of 2) Never done     MEDICARE ANNUAL WELLNESS VISIT  Never done     Pneumococcal Vaccine: 65+ Years (1 of 1 - PPSV23) Never done     EYE EXAM  2021     INFLUENZA VACCINE (1) 2021     FALL RISK ASSESSMENT  2021     Labs reviewed in EPIC      Diabetic foot exam   1. foot deformity   Bunion bilaterally  2. Current or previous foot ulceration     No  3. Current or previous pre-ulcerative calluses     No  4. previous partial amputation of one or both feet or complete amputation of one foot     No  5. peripheral neuropathy with evidence of callus formation     No  6. poor circulation     No  Approval given for 3 pairs of diabetic shoes and inserts if patient desires.      Review of Systems  Constitutional, HEENT, cardiovascular, pulmonary, gi and gu systems are negative, except as otherwise noted.    OBJECTIVE:   /70   Pulse 77   Temp 97.6  F (36.4  C)   Ht  "1.791 m (5' 10.5\")   Wt 95.3 kg (210 lb)   SpO2 96%   BMI 29.71 kg/m   Estimated body mass index is 29.71 kg/m  as calculated from the following:    Height as of this encounter: 1.791 m (5' 10.5\").    Weight as of this encounter: 95.3 kg (210 lb).  Physical Exam  GENERAL: healthy, alert and no distress  EYES: Eyes grossly normal to inspection, PERRL and conjunctivae and sclerae normal  HENT: ear canals and TM's normal, nose and mouth without ulcers or lesions  NECK: no adenopathy, no asymmetry, masses, or scars and thyroid normal to palpation  RESP: lungs clear to auscultation - no rales, rhonchi or wheezes  CV: regular rate and rhythm, normal S1 S2, no S3 or S4, no murmur, click or rub, no peripheral edema and peripheral pulses strong  ABDOMEN: soft, nontender, no hepatosplenomegaly, no masses and bowel sounds normal  MS: no gross musculoskeletal defects noted, no edema  SKIN: no suspicious lesions or rashes  NEURO: Normal strength and tone, mentation intact and speech normal  PSYCH: mentation appears normal, affect normal/bright    Diagnostic Test Results:  Labs reviewed in Epic    ASSESSMENT / PLAN:       ICD-10-CM    1. Type 2 diabetes mellitus without complication, without long-term current use of insulin (H)  E11.9 FL FOOT EXAM NO CHARGE     metFORMIN (GLUCOPHAGE-XR) 500 MG 24 hr tablet     Hemoglobin A1c     Basic metabolic panel   2. Encounter for Medicare annual wellness exam  Z00.00    3. Need for prophylactic vaccination and inoculation against influenza  Z23 INFLUENZA, QUAD, HIGH DOSE, PF, 65YR + (FLUZONE HD)   4. TERESO (obstructive sleep apnea)  G47.33 Miscellaneous Order for DME - ONLY FOR DME           COUNSELING:  Reviewed preventive health counseling, as reflected in patient instructions  Special attention given to:    Estimated body mass index is 29.71 kg/m  as calculated from the following:    Height as of this encounter: 1.791 m (5' 10.5\").    Weight as of this encounter: 95.3 kg (210 " lb).        He reports that he quit smoking about 53 years ago. His smoking use included cigarettes. He has never used smokeless tobacco.      Appropriate preventive services were discussed with this patient, including applicable screening as appropriate for cardiovascular disease, diabetes, osteopenia/osteoporosis, and glaucoma.  As appropriate for age/gender, discussed screening for colorectal cancer, prostate cancer, breast cancer, and cervical cancer. Checklist reviewing preventive services available has been given to the patient.    Reviewed patients plan of care and provided an AVS. The Basic Care Plan (routine screening as documented in Health Maintenance) for Efrain meets the Care Plan requirement. This Care Plan has been established and reviewed with the Patient.    Counseling Resources:  ATP IV Guidelines  Pooled Cohorts Equation Calculator  Breast Cancer Risk Calculator  Breast Cancer: Medication to Reduce Risk  FRAX Risk Assessment  ICSI Preventive Guidelines  Dietary Guidelines for Americans, 2010  USDA's MyPlate  ASA Prophylaxis  Lung CA Screening    Bib Spencer MD  [facility]    Identified Health Risks:   Discharged

## 2025-02-12 NOTE — PROGRESS NOTES
"  Assessment & Plan     Type 2 diabetes mellitus without complication, without long-term current use of insulin (H)  Doing great but creeping up slightly.  Update labs.  Follow 3 months.  He is going to follow and monitor.    - SC FOOT EXAM NO CHARGE  - Albumin Random Urine Quantitative with Creat Ratio    Essential hypertension  Stable.  Update.    - Albumin Random Urine Quantitative with Creat Ratio    Dyslipidemia  Stable.  Update stable.                BMI  Estimated body mass index is 30.36 kg/m  as calculated from the following:    Height as of 5/20/24: 1.803 m (5' 11\").    Weight as of this encounter: 98.7 kg (217 lb 11.2 oz).             No follow-ups on file.    Charline Quiroz is a 84 year old, presenting for the following health issues:  Diabetes        2/20/2025     1:51 PM   Additional Questions   Roomed by Akiko ECHEVARRIA     HPI     Diabetes Follow-up    How often are you checking your blood sugar? Not at all  What concerns do you have today about your diabetes? None   Do you have any of these symptoms? (Select all that apply)  Redness, sores, or blisters on feet  Have you had a diabetic eye exam in the last 12 months? No            Hyperlipidemia Follow-Up    Are you regularly taking any medication or supplement to lower your cholesterol?   No  Are you having muscle aches or other side effects that you think could be caused by your cholesterol lowering medication?  No    Hypertension Follow-up    Do you check your blood pressure regularly outside of the clinic? No   Are you following a low salt diet? Yes  Are your blood pressures ever more than 140 on the top number (systolic) OR more   than 90 on the bottom number (diastolic), for example 140/90? N/A    BP Readings from Last 2 Encounters:   02/20/25 132/80   11/21/24 122/82     Hemoglobin A1C (%)   Date Value   02/18/2025 7.6 (H)   11/18/2024 7.3 (H)   09/17/2020 7.2 (H)   08/27/2018 7.1 (H)     LDL Cholesterol Calculated (mg/dL)   Date Value "   02/18/2025 108 (H)   02/15/2024 109 (H)   09/17/2020 107 (H)   05/23/2017 89     Diabetic foot exam   1. foot deformity   No  2. Current or previous foot ulceration     Yes  3. Current or previous pre-ulcerative calluses     Yes  4. previous partial amputation of one or both feet or complete amputation of one foot     No  5. peripheral neuropathy with evidence of callus formation     No  6. poor circulation     No  Approval given for 3 pairs of diabetic shoes and inserts if patient desires.        Review of Systems  Constitutional, HEENT, cardiovascular, pulmonary, gi and gu systems are negative, except as otherwise noted.      Objective    /80 (BP Location: Left arm, Patient Position: Sitting)   Pulse 73   Temp 97.9  F (36.6  C) (Tympanic)   Wt 98.7 kg (217 lb 11.2 oz)   SpO2 97%   BMI 30.36 kg/m    Body mass index is 30.36 kg/m .  Physical Exam   GENERAL: alert and no distress  NECK: no adenopathy, no asymmetry, masses, or scars  RESP: lungs clear to auscultation - no rales, rhonchi or wheezes  CV: regular rate and rhythm, normal S1 S2, no S3 or S4, no murmur, click or rub, no peripheral edema  ABDOMEN: soft, nontender, no hepatosplenomegaly, no masses and bowel sounds normal  MS: no gross musculoskeletal defects noted, no edema    Labs reviewed.      The longitudinal plan of care for the diagnosis(es)/condition(s) as documented were addressed during this visit. Due to the added complexity in care, I will continue to support Art in the subsequent management and with ongoing continuity of care.        Signed Electronically by: Bib Spencer MD

## 2025-02-18 ENCOUNTER — LAB (OUTPATIENT)
Dept: LAB | Facility: OTHER | Age: 85
End: 2025-02-18
Payer: MEDICARE

## 2025-02-18 DIAGNOSIS — I10 ESSENTIAL HYPERTENSION: ICD-10-CM

## 2025-02-18 DIAGNOSIS — E11.9 TYPE 2 DIABETES MELLITUS WITHOUT COMPLICATION, WITHOUT LONG-TERM CURRENT USE OF INSULIN (H): ICD-10-CM

## 2025-02-18 LAB
ALBUMIN SERPL BCG-MCNC: 3.4 G/DL (ref 3.5–5.2)
ALP SERPL-CCNC: 118 U/L (ref 40–150)
ALT SERPL W P-5'-P-CCNC: 15 U/L (ref 0–70)
ANION GAP SERPL CALCULATED.3IONS-SCNC: 8 MMOL/L (ref 7–15)
AST SERPL W P-5'-P-CCNC: 20 U/L (ref 0–45)
BILIRUB SERPL-MCNC: 1 MG/DL
BUN SERPL-MCNC: 13.2 MG/DL (ref 8–23)
CALCIUM SERPL-MCNC: 8.7 MG/DL (ref 8.8–10.4)
CHLORIDE SERPL-SCNC: 103 MMOL/L (ref 98–107)
CHOLEST SERPL-MCNC: 174 MG/DL
CREAT SERPL-MCNC: 0.99 MG/DL (ref 0.67–1.17)
EGFRCR SERPLBLD CKD-EPI 2021: 75 ML/MIN/1.73M2
EST. AVERAGE GLUCOSE BLD GHB EST-MCNC: 171 MG/DL
FASTING STATUS PATIENT QL REPORTED: YES
FASTING STATUS PATIENT QL REPORTED: YES
GLUCOSE SERPL-MCNC: 157 MG/DL (ref 70–99)
HBA1C MFR BLD: 7.6 %
HCO3 SERPL-SCNC: 28 MMOL/L (ref 22–29)
HDLC SERPL-MCNC: 54 MG/DL
LDLC SERPL CALC-MCNC: 108 MG/DL
NONHDLC SERPL-MCNC: 120 MG/DL
POTASSIUM SERPL-SCNC: 4.3 MMOL/L (ref 3.4–5.3)
PROT SERPL-MCNC: 7.8 G/DL (ref 6.4–8.3)
SODIUM SERPL-SCNC: 139 MMOL/L (ref 135–145)
TRIGL SERPL-MCNC: 62 MG/DL
TSH SERPL DL<=0.005 MIU/L-ACNC: 2.03 UIU/ML (ref 0.3–4.2)

## 2025-02-18 PROCEDURE — 36415 COLL VENOUS BLD VENIPUNCTURE: CPT | Mod: ZL

## 2025-02-18 PROCEDURE — 80061 LIPID PANEL: CPT | Mod: ZL

## 2025-02-18 PROCEDURE — 80053 COMPREHEN METABOLIC PANEL: CPT | Mod: ZL

## 2025-02-18 PROCEDURE — 84443 ASSAY THYROID STIM HORMONE: CPT | Mod: ZL

## 2025-02-18 PROCEDURE — 82040 ASSAY OF SERUM ALBUMIN: CPT | Mod: ZL

## 2025-02-18 PROCEDURE — 83036 HEMOGLOBIN GLYCOSYLATED A1C: CPT | Mod: ZL

## 2025-02-20 ENCOUNTER — OFFICE VISIT (OUTPATIENT)
Dept: FAMILY MEDICINE | Facility: OTHER | Age: 85
End: 2025-02-20
Attending: FAMILY MEDICINE
Payer: COMMERCIAL

## 2025-02-20 VITALS
DIASTOLIC BLOOD PRESSURE: 80 MMHG | TEMPERATURE: 97.9 F | WEIGHT: 217.7 LBS | HEART RATE: 73 BPM | SYSTOLIC BLOOD PRESSURE: 132 MMHG | BODY MASS INDEX: 30.36 KG/M2 | OXYGEN SATURATION: 97 %

## 2025-02-20 DIAGNOSIS — I10 ESSENTIAL HYPERTENSION: ICD-10-CM

## 2025-02-20 DIAGNOSIS — E11.9 TYPE 2 DIABETES MELLITUS WITHOUT COMPLICATION, WITHOUT LONG-TERM CURRENT USE OF INSULIN (H): Primary | ICD-10-CM

## 2025-02-20 DIAGNOSIS — E78.2 MIXED HYPERLIPIDEMIA: ICD-10-CM

## 2025-02-20 RX ORDER — AMOXICILLIN 500 MG/1
500 TABLET, FILM COATED ORAL 3 TIMES DAILY
COMMUNITY
Start: 2025-02-19

## 2025-03-19 DIAGNOSIS — M19.90 OSTEOARTHRITIS, UNSPECIFIED OSTEOARTHRITIS TYPE, UNSPECIFIED SITE: ICD-10-CM

## 2025-05-15 NOTE — PROGRESS NOTES
"  Assessment & Plan     Essential hypertension  Stable.      Type 2 diabetes mellitus without complication, without long-term current use of insulin (H)  Increasing.  We reviewed med options going forward.  We are sticking with the januvia for now, with the option to add back the metformin if he continues to rise.  He has been eating a lot of candy and will slow that down.  No change in meds now but nice review of options going forward and I think we'll settle on metformin restart if ongoing elevation.    - Hemoglobin A1c; Future  - Basic metabolic panel; Future    TERESO (obstructive sleep apnea)  He sleeps in the chair now.  Uses the cpap in the bed but usually just sits at an angle in a recliner and has not trouble with that.            BMI  Estimated body mass index is 29.57 kg/m  as calculated from the following:    Height as of 5/20/24: 1.803 m (5' 11\").    Weight as of this encounter: 96.2 kg (212 lb).             Subjective   Art is a 85 year old, presenting for the following health issues:  Diabetes        5/20/2025     1:55 PM   Additional Questions   Roomed by Dayana WALKER      Diabetes Follow-up    How often are you checking your blood sugar? Not at all  What concerns do you have today about your diabetes? None   Do you have any of these symptoms? (Select all that apply)  No numbness or tingling in feet.  No redness, sores or blisters on feet.  No complaints of excessive thirst.  No reports of blurry vision.  No significant changes to weight.  Have you had a diabetic eye exam in the last 12 months? No        BP Readings from Last 2 Encounters:   05/20/25 118/70   02/20/25 132/80     Hemoglobin A1C (%)   Date Value   05/16/2025 8.0 (H)   02/18/2025 7.6 (H)   09/17/2020 7.2 (H)   08/27/2018 7.1 (H)     LDL Cholesterol Calculated (mg/dL)   Date Value   02/18/2025 108 (H)   02/15/2024 109 (H)   09/17/2020 107 (H)   05/23/2017 89             Hypertension Follow-up    Do you check your blood pressure regularly " outside of the clinic? No   Are you following a low salt diet? Yes  Are your blood pressures ever more than 140 on the top number (systolic) OR more   than 90 on the bottom number (diastolic), for example 140/90? N/A    BP Readings from Last 2 Encounters:   05/20/25 118/70   02/20/25 132/80           Review of Systems  Constitutional, HEENT, cardiovascular, pulmonary, gi and gu systems are negative, except as otherwise noted.      Objective    /70   Pulse 64   Temp 97.5  F (36.4  C) (Tympanic)   Wt 96.2 kg (212 lb)   SpO2 97%   BMI 29.57 kg/m    Body mass index is 29.57 kg/m .  Physical Exam   GENERAL: alert and no distress  NECK: no adenopathy, no asymmetry, masses, or scars  RESP: lungs clear to auscultation - no rales, rhonchi or wheezes  CV: regular rate and rhythm, normal S1 S2, no S3 or S4, no murmur, click or rub, no peripheral edema  ABDOMEN: soft, nontender, no hepatosplenomegaly, no masses and bowel sounds normal  MS: no gross musculoskeletal defects noted, no edema    Labs reviewed as above.  Ordered for next time.      The longitudinal plan of care for the diagnosis(es)/condition(s) as documented were addressed during this visit. Due to the added complexity in care, I will continue to support Art in the subsequent management and with ongoing continuity of care.        Signed Electronically by: Bib Spencer MD

## 2025-05-16 ENCOUNTER — RESULTS FOLLOW-UP (OUTPATIENT)
Dept: FAMILY MEDICINE | Facility: OTHER | Age: 85
End: 2025-05-16

## 2025-05-16 ENCOUNTER — LAB (OUTPATIENT)
Dept: LAB | Facility: OTHER | Age: 85
End: 2025-05-16
Payer: MEDICARE

## 2025-05-16 DIAGNOSIS — E11.9 TYPE 2 DIABETES MELLITUS WITHOUT COMPLICATION, WITHOUT LONG-TERM CURRENT USE OF INSULIN (H): ICD-10-CM

## 2025-05-16 LAB
ANION GAP SERPL CALCULATED.3IONS-SCNC: 10 MMOL/L (ref 7–15)
BUN SERPL-MCNC: 19.4 MG/DL (ref 8–23)
CALCIUM SERPL-MCNC: 8.8 MG/DL (ref 8.8–10.4)
CHLORIDE SERPL-SCNC: 104 MMOL/L (ref 98–107)
CREAT SERPL-MCNC: 1.06 MG/DL (ref 0.67–1.17)
EGFRCR SERPLBLD CKD-EPI 2021: 69 ML/MIN/1.73M2
EST. AVERAGE GLUCOSE BLD GHB EST-MCNC: 183 MG/DL
GLUCOSE SERPL-MCNC: 149 MG/DL (ref 70–99)
HBA1C MFR BLD: 8 %
HCO3 SERPL-SCNC: 22 MMOL/L (ref 22–29)
POTASSIUM SERPL-SCNC: 4.3 MMOL/L (ref 3.4–5.3)
SODIUM SERPL-SCNC: 136 MMOL/L (ref 135–145)

## 2025-05-16 PROCEDURE — 83036 HEMOGLOBIN GLYCOSYLATED A1C: CPT | Mod: ZL

## 2025-05-16 PROCEDURE — 80048 BASIC METABOLIC PNL TOTAL CA: CPT | Mod: ZL

## 2025-05-16 PROCEDURE — 3052F HG A1C>EQUAL 8.0%<EQUAL 9.0%: CPT

## 2025-05-16 PROCEDURE — 36415 COLL VENOUS BLD VENIPUNCTURE: CPT | Mod: ZL

## 2025-05-20 ENCOUNTER — OFFICE VISIT (OUTPATIENT)
Dept: FAMILY MEDICINE | Facility: OTHER | Age: 85
End: 2025-05-20
Attending: FAMILY MEDICINE
Payer: MEDICARE

## 2025-05-20 VITALS
DIASTOLIC BLOOD PRESSURE: 70 MMHG | WEIGHT: 212 LBS | TEMPERATURE: 97.5 F | OXYGEN SATURATION: 97 % | BODY MASS INDEX: 29.57 KG/M2 | SYSTOLIC BLOOD PRESSURE: 118 MMHG | HEART RATE: 64 BPM

## 2025-05-20 DIAGNOSIS — I10 ESSENTIAL HYPERTENSION: ICD-10-CM

## 2025-05-20 DIAGNOSIS — G47.33 OSA (OBSTRUCTIVE SLEEP APNEA): ICD-10-CM

## 2025-05-20 DIAGNOSIS — E11.9 TYPE 2 DIABETES MELLITUS WITHOUT COMPLICATION, WITHOUT LONG-TERM CURRENT USE OF INSULIN (H): Primary | ICD-10-CM

## 2025-05-20 PROCEDURE — G0463 HOSPITAL OUTPT CLINIC VISIT: HCPCS

## 2025-05-20 ASSESSMENT — PAIN SCALES - GENERAL: PAINLEVEL_OUTOF10: NO PAIN (0)

## 2025-07-06 ENCOUNTER — APPOINTMENT (OUTPATIENT)
Dept: GENERAL RADIOLOGY | Facility: HOSPITAL | Age: 85
End: 2025-07-06
Payer: MEDICARE

## 2025-07-06 ENCOUNTER — HOSPITAL ENCOUNTER (EMERGENCY)
Facility: HOSPITAL | Age: 85
Discharge: HOME | End: 2025-07-06
Payer: MEDICARE

## 2025-07-06 VITALS
RESPIRATION RATE: 18 BRPM | BODY MASS INDEX: 29.29 KG/M2 | DIASTOLIC BLOOD PRESSURE: 107 MMHG | WEIGHT: 210.03 LBS | TEMPERATURE: 97.4 F | OXYGEN SATURATION: 96 % | SYSTOLIC BLOOD PRESSURE: 189 MMHG | HEART RATE: 62 BPM
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
## 2025-07-06 DIAGNOSIS — M25.511 ACUTE PAIN OF RIGHT SHOULDER: ICD-10-CM

## 2025-07-06 DIAGNOSIS — M25.521 RIGHT ELBOW PAIN: ICD-10-CM

## 2025-07-06 PROCEDURE — 73060 X-RAY EXAM OF HUMERUS: CPT | Mod: RT

## 2025-07-06 PROCEDURE — 73060 X-RAY EXAM OF HUMERUS: CPT | Mod: 26 | Performed by: RADIOLOGY

## 2025-07-06 PROCEDURE — G0463 HOSPITAL OUTPT CLINIC VISIT: HCPCS

## 2025-07-06 PROCEDURE — 99213 OFFICE O/P EST LOW 20 MIN: CPT

## 2025-07-06 PROCEDURE — 73080 X-RAY EXAM OF ELBOW: CPT | Mod: RT

## 2025-07-06 PROCEDURE — 73030 X-RAY EXAM OF SHOULDER: CPT | Mod: 26 | Performed by: RADIOLOGY

## 2025-07-06 PROCEDURE — 73030 X-RAY EXAM OF SHOULDER: CPT | Mod: RT

## 2025-07-06 PROCEDURE — 73080 X-RAY EXAM OF ELBOW: CPT | Mod: 26 | Performed by: RADIOLOGY

## 2025-07-06 ASSESSMENT — ENCOUNTER SYMPTOMS
ACTIVITY CHANGE: 1
NECK PAIN: 0
NUMBNESS: 0
ARTHRALGIAS: 1

## 2025-07-06 ASSESSMENT — ACTIVITIES OF DAILY LIVING (ADL): ADLS_ACUITY_SCORE: 41

## 2025-07-06 ASSESSMENT — COLUMBIA-SUICIDE SEVERITY RATING SCALE - C-SSRS
1. IN THE PAST MONTH, HAVE YOU WISHED YOU WERE DEAD OR WISHED YOU COULD GO TO SLEEP AND NOT WAKE UP?: NO
2. HAVE YOU ACTUALLY HAD ANY THOUGHTS OF KILLING YOURSELF IN THE PAST MONTH?: NO
6. HAVE YOU EVER DONE ANYTHING, STARTED TO DO ANYTHING, OR PREPARED TO DO ANYTHING TO END YOUR LIFE?: NO

## 2025-07-06 NOTE — ED TRIAGE NOTES
LIZY Thakkar CNP assessed patient in triage and determined patient Urgent Care appropriate. Will be seen in Urgent Care.   Patient states he got up to the bathroom last night and tripped on the rug, fell on his right elbow and states he thinks he jammed something in his shoulder. Not on thinners and denies hitting his head     Triage Assessment (Adult)       Row Name 07/06/25 0931          Triage Assessment    Airway WDL WDL        Respiratory WDL    Respiratory WDL WDL

## 2025-07-06 NOTE — ED PROVIDER NOTES
History     Chief Complaint   Patient presents with    Fall     HPI  Efrain Weiner is a 85 year old male who presents to the urgent care with complaints of right shoulder, elbow, and upper arm pain after tripping on a rug and falling last night. He denies hitting head. Denies neck pain. No numbness/tingling. Does not take blood thinners.     Allergies:  Allergies   Allergen Reactions    Nkda [No Known Drug Allergy]        Problem List:    Patient Active Problem List    Diagnosis Date Noted    Closed wedge compression fracture of fourth thoracic vertebra (H) 05/26/2017     Priority: Medium     Formatting of this note might be different from the original.  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  IMO Update      S/P total knee arthroplasty 07/10/2014     Priority: Medium    Status post THR (total hip replacement) 04/03/2014     Priority: Medium    Mixed dyslipidemia 04/03/2014     Priority: Medium    Dyslipidemia      Priority: Medium    TERESO (obstructive sleep apnea)      Priority: Medium    Prostate cancer (H) 04/26/2013     Priority: Medium     08/2011  S/P DaVinci prostatectomy      Actinic keratosis 08/06/2012     Priority: Medium    Alopecia areata 08/06/2012     Priority: Medium    Inflamed seborrheic keratosis 08/06/2012     Priority: Medium    Osteoarthrosis, multiple sites 03/25/2011     Priority: Medium    ED (erectile dysfunction) 03/25/2011     Priority: Medium     BPH (benign prostatic hyerplasia) 2005     Priority: Medium    Type 2 diabetes mellitus without complication (H) 2004     Priority: Medium        Past Medical History:    Past Medical History:   Diagnosis Date    Allergic rhinitis, seasonal 3/25/2011    BPH 2005    Cancer, Prostate 3/25/2011    Chronic prostatitis 2007    Diabetes Mellitus, Type 2 2004    Dyslipidemia 3/25/2011    Eczema 3/25/2011    Erectile Dysfunction 3/25/2011    Mixed hyperlipidemia     Nephrolithiasis 2011    TERESO on CPAP     Osteoarthritis, Generalized 3/25/2011       Past Surgical History:    Past Surgical History:   Procedure Laterality Date    BIOPSY PROSTATE TRANSRECTAL      cataract extraction      right    COLONOSCOPY          COLONOSCOPY  2003    excision of acrochordon      excision of peritonsillar abscess      kidney stones blasted      laparoscopic bilateral inguinal hernia repair  2011    Bilateral inguinal hernia    prostatectomy  2011    Adenocarcinoma of the prostate    shoulder arthroscopy with Pam procedure      right    VASECTOMY         Family History:    Family History   Problem Relation Age of Onset    Prostate Cancer Father         cause of death    Diabetes Father     Hypertension Father     Other - See Comments Father         renal disease    Cerebrovascular Disease Mother         CVA    Diabetes Paternal Grandfather     Glaucoma Brother     Asthma No family hx of        Social History:  Marital Status:   [2]  Social History     Tobacco Use    Smoking status: Former     Current packs/day: 0.00     Types: Cigarettes     Quit date: 1969     Years since quittin.4    Smokeless tobacco: Never    Tobacco comments:     no passive smoke exposure   Vaping Use    Vaping status: Never Used   Substance Use Topics    Alcohol use: Yes     Comment: socially    Drug use: No        Medications:    Cholecalciferol (VITAMIN D) 2000 UNITS tablet  DiphenhydrAMINE HCl,  Sleep, 25 MG CAPS  ibuprofen (ADVIL,MOTRIN) 200 MG tablet  JANUVIA 100 MG tablet  nabumetone (RELAFEN) 750 MG tablet  PSEUDOEPHEDRINE HCL 60 MG OR TABS          Review of Systems   Constitutional:  Positive for activity change.   Musculoskeletal:  Positive for arthralgias. Negative for neck pain.   Neurological:  Negative for numbness.   All other systems reviewed and are negative.      Physical Exam   BP: (!) 189/107  Pulse: 62  Temp: 97.4  F (36.3  C)  Resp: 18  Weight: 95.3 kg (210 lb 0.5 oz)  SpO2: 96 %      Physical Exam  Vitals and nursing note reviewed.   Constitutional:       General: He is not in acute distress.     Appearance: Normal appearance. He is not ill-appearing or toxic-appearing.   Cardiovascular:      Rate and Rhythm: Normal rate and regular rhythm.      Heart sounds: Normal heart sounds. No murmur heard.  Pulmonary:      Effort: Pulmonary effort is normal.      Breath sounds: Normal breath sounds. No wheezing, rhonchi or rales.   Musculoskeletal:         General: Tenderness present. No swelling or deformity.      Right shoulder: Tenderness and bony tenderness present. No swelling. Decreased range of motion. Normal strength. Normal pulse.      Right upper arm: Tenderness and bony tenderness present. No swelling.      Right elbow: No swelling. Decreased range of motion. Tenderness present in olecranon process.        Arms:       Cervical back: No rigidity, tenderness or bony tenderness. No pain with movement.   Skin:     General: Skin is warm and dry.      Capillary Refill: Capillary refill takes less than 2 seconds.   Neurological:      General: No focal deficit present.      Mental Status: He is alert and oriented to person, place, and time.   Psychiatric:         Mood and Affect: Mood normal.         Behavior: Behavior normal.         Thought Content: Thought content normal.         Judgment: Judgment normal.         ED Course        Procedures         Recent Results (from the past 24 hours)    XR Humerus Right G/E 2 Views    Narrative    EXAM: XR HUMERUS RIGHT G/E 2 VIEWS, XR SHOULDER RIGHT G/E 3 VIEWS, XR ELBOW RIGHT G/E 3 VIEWS  LOCATION: [facility]  DATE: 7/6/2025    INDICATION: Right shoulder pain.  COMPARISON: None.      Impression    IMPRESSION: No acute fracture or dislocation. Mild degenerative changes at the glenohumeral and acromioclavicular joints. Small elbow joint effusion with mild degenerative changes in the elbow.   XR Elbow Right G/E 3 Views    Narrative    EXAM: XR HUMERUS RIGHT G/E 2 VIEWS, XR SHOULDER RIGHT G/E 3 VIEWS, XR ELBOW RIGHT G/E 3 VIEWS  LOCATION: [facility]  DATE: 7/6/2025    INDICATION: Right shoulder pain.  COMPARISON: None.      Impression    IMPRESSION: No acute fracture or dislocation. Mild degenerative changes at the glenohumeral and acromioclavicular joints. Small elbow joint effusion with mild degenerative changes in the elbow.   XR Shoulder Right G/E 3 Views    Narrative    EXAM: XR HUMERUS RIGHT G/E 2 VIEWS, XR SHOULDER RIGHT G/E 3 VIEWS, XR ELBOW RIGHT G/E 3 VIEWS  LOCATION: [facility]  DATE: 7/6/2025    INDICATION: Right shoulder pain.  COMPARISON: None.      Impression    IMPRESSION: No acute fracture or dislocation. Mild degenerative changes at the glenohumeral and acromioclavicular joints. Small elbow joint effusion with mild degenerative changes in the elbow.       Medications - No data to display    Assessments & Plan (with Medical Decision Making)     I have reviewed the nursing notes.    I have reviewed the findings, diagnosis, plan and need for follow up with the patient.  Efrain Weiner is a 85 year old male who presents to the urgent care with complaints of right shoulder, elbow, and upper arm pain after tripping on a rug and falling last night. He denies hitting head. Denies neck pain. No numbness/tingling. Does not take blood thinners.     MDM: afebrile. Non toxic in appearance with no noted distress. Lungs clear,  heart tones regular. Fully ambulatory. Strong pulses to right upper extremity. ROM mildly decreased to right shoulder with anterior and posterior tenderness radiating into upper arm and over the olecranon. XR reviewed with no acute fracture or dislocation. Encouraged rest and ice. If pain persists in one week, encouraged close follow up. He is in agreement with plan.     (W19.XXXA) Fall, initial encounter,   (M25.511) Acute pain of right shoulder, (M25.521) Right elbow pain  Plan: Rest. Ice and elevate.   Tylenol and ibuprofen as directed if needed.   Follow up in the clinic in one week if pain persists.   Return with any new or concerning symptoms. Understanding verbalized.    Discharge Medication List as of 7/6/2025 10:12 AM          Final diagnoses:   Fall, initial encounter   Acute pain of right shoulder   Right elbow pain       7/6/2025   [facility]       Anya Thakkar NP  07/06/25 [id number]

## 2025-07-06 NOTE — DISCHARGE INSTRUCTIONS
Rest. Ice and elevate.   Tylenol and ibuprofen as directed if needed.   Follow up in the clinic in one week if pain persists.   Return with any new or concerning symptoms.

## 2025-08-26 ENCOUNTER — TELEPHONE (OUTPATIENT)
Dept: FAMILY MEDICINE | Facility: OTHER | Age: 85
End: 2025-08-26

## 2025-08-29 ENCOUNTER — LAB (OUTPATIENT)
Dept: LAB | Facility: OTHER | Age: 85
End: 2025-08-29
Payer: MEDICARE

## 2025-08-29 DIAGNOSIS — E11.9 TYPE 2 DIABETES MELLITUS WITHOUT COMPLICATION, WITHOUT LONG-TERM CURRENT USE OF INSULIN (H): ICD-10-CM

## 2025-08-29 LAB
ANION GAP SERPL CALCULATED.3IONS-SCNC: 10 MMOL/L (ref 7–15)
BUN SERPL-MCNC: 16.1 MG/DL (ref 8–23)
CALCIUM SERPL-MCNC: 8.9 MG/DL (ref 8.8–10.4)
CHLORIDE SERPL-SCNC: 107 MMOL/L (ref 98–107)
CREAT SERPL-MCNC: 1.03 MG/DL (ref 0.67–1.17)
EGFRCR SERPLBLD CKD-EPI 2021: 72 ML/MIN/1.73M2
EST. AVERAGE GLUCOSE BLD GHB EST-MCNC: 177 MG/DL
GLUCOSE SERPL-MCNC: 145 MG/DL (ref 70–99)
HBA1C MFR BLD: 7.8 %
HCO3 SERPL-SCNC: 22 MMOL/L (ref 22–29)
POTASSIUM SERPL-SCNC: 4.3 MMOL/L (ref 3.4–5.3)
SODIUM SERPL-SCNC: 139 MMOL/L (ref 135–145)

## 2025-08-29 PROCEDURE — 83036 HEMOGLOBIN GLYCOSYLATED A1C: CPT | Mod: ZL

## 2025-08-29 PROCEDURE — 84132 ASSAY OF SERUM POTASSIUM: CPT | Mod: ZL

## 2025-08-29 PROCEDURE — 36415 COLL VENOUS BLD VENIPUNCTURE: CPT | Mod: ZL

## 2025-08-29 PROCEDURE — 3051F HG A1C>EQUAL 7.0%<8.0%: CPT

## 2025-09-03 ENCOUNTER — OFFICE VISIT (OUTPATIENT)
Dept: FAMILY MEDICINE | Facility: OTHER | Age: 85
End: 2025-09-03
Attending: FAMILY MEDICINE
Payer: MEDICARE

## 2025-09-03 VITALS
DIASTOLIC BLOOD PRESSURE: 80 MMHG | HEART RATE: 66 BPM | WEIGHT: 206.7 LBS | HEIGHT: 71 IN | TEMPERATURE: 97 F | BODY MASS INDEX: 28.94 KG/M2 | SYSTOLIC BLOOD PRESSURE: 136 MMHG | OXYGEN SATURATION: 95 %

## 2025-09-03 DIAGNOSIS — I10 ESSENTIAL HYPERTENSION: Primary | ICD-10-CM

## 2025-09-03 DIAGNOSIS — E11.9 TYPE 2 DIABETES MELLITUS WITHOUT COMPLICATION, WITHOUT LONG-TERM CURRENT USE OF INSULIN (H): ICD-10-CM

## 2025-09-03 DIAGNOSIS — G89.29 CHRONIC RIGHT SHOULDER PAIN: ICD-10-CM

## 2025-09-03 DIAGNOSIS — M25.511 CHRONIC RIGHT SHOULDER PAIN: ICD-10-CM

## 2025-09-03 PROCEDURE — G0463 HOSPITAL OUTPT CLINIC VISIT: HCPCS

## 2025-09-03 ASSESSMENT — PAIN SCALES - GENERAL: PAINLEVEL_OUTOF10: MODERATE PAIN (5)
